# Patient Record
Sex: FEMALE | Race: WHITE | NOT HISPANIC OR LATINO | Employment: UNEMPLOYED | URBAN - METROPOLITAN AREA
[De-identification: names, ages, dates, MRNs, and addresses within clinical notes are randomized per-mention and may not be internally consistent; named-entity substitution may affect disease eponyms.]

---
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
## 2023-02-02 DIAGNOSIS — R10.9 ABDOMINAL CRAMPING: Primary | ICD-10-CM

## 2023-03-10 ENCOUNTER — OFFICE VISIT (OUTPATIENT)
Dept: FAMILY MEDICINE CLINIC | Facility: CLINIC | Age: 18
End: 2023-03-10

## 2023-03-10 VITALS
TEMPERATURE: 97.8 F | SYSTOLIC BLOOD PRESSURE: 120 MMHG | OXYGEN SATURATION: 97 % | BODY MASS INDEX: 27.91 KG/M2 | HEART RATE: 98 BPM | HEIGHT: 63 IN | DIASTOLIC BLOOD PRESSURE: 86 MMHG | WEIGHT: 157.5 LBS

## 2023-03-10 DIAGNOSIS — F41.1 GENERALIZED ANXIETY DISORDER: Primary | ICD-10-CM

## 2023-03-10 DIAGNOSIS — D64.9 ANEMIA, UNSPECIFIED TYPE: ICD-10-CM

## 2023-03-10 DIAGNOSIS — F41.0 PANIC ATTACKS: ICD-10-CM

## 2023-03-10 DIAGNOSIS — G47.9 SLEEP DISTURBANCE: ICD-10-CM

## 2023-03-10 DIAGNOSIS — N92.0 MENORRHAGIA WITH REGULAR CYCLE: ICD-10-CM

## 2023-03-10 RX ORDER — ALPRAZOLAM 0.25 MG/1
0.25 TABLET ORAL
Qty: 5 TABLET | Refills: 0 | Status: SHIPPED | OUTPATIENT
Start: 2023-03-10

## 2023-03-10 RX ORDER — ESCITALOPRAM OXALATE 10 MG/1
10 TABLET ORAL DAILY
Qty: 30 TABLET | Refills: 0 | Status: SHIPPED | OUTPATIENT
Start: 2023-03-10

## 2023-03-10 NOTE — PROGRESS NOTES
Name: Elijah Campos      : 2005      MRN: [id number]  Encounter Provider: Tres Mock MD  Encounter Date: 3/10/2023   Encounter department: [facility] [facility]     1  Generalized anxiety disorder  -     escitalopram (Lexapro) 10 mg tablet; Take 1 tablet (10 mg total) by mouth daily  -     ALPRAZolam (XANAX) 0 25 mg tablet; Take 1 tablet (0 25 mg total) by mouth daily at bedtime as needed for anxiety  -     TSH, 3rd generation with Free T4 reflex; Future  -     Basic metabolic panel; Future    2  Panic attacks  -     TSH, 3rd generation with Free T4 reflex; Future  -     Basic metabolic panel; Future    3  Sleep disturbance  -     TSH, 3rd generation with Free T4 reflex; Future  -     Basic metabolic panel; Future    4  Menorrhagia with regular cycle    5  Anemia, unspecified type  -     TSH, 3rd generation with Free T4 reflex; Future  -     Basic metabolic panel; Future  -     CBC and Platelet; Future  -     Iron Panel (Includes Ferritin, Iron Sat%, Iron, and TIBC); Future  Stop Prozac  Start Lexapro  Patient has panic attacks when going to the dentist   We will provide her with 0 25 mg of Xanax for dental visits  Obtain lab work and follow-up in 6 weeks for recheck         Subjective      Patient presents with: Insomnia: Patient not sleeping     She has tried melatonin  Has trouble falling asleep and staying asleep  Currently taking Prozac for anxiety and depression  Takes this at 7:00 in the morning  Patient also endorses heavy periods and restless legs at bedtime  She has had a low MCV on previous lab work  Review of Systems   Constitutional: Negative for fatigue and fever  Restless legs    HENT: Negative for sore throat  Eyes: Negative for visual disturbance  Respiratory: Negative for cough, chest tightness and shortness of breath  Cardiovascular: Negative for chest pain, palpitations and leg swelling     Gastrointestinal: Negative for abdominal pain, constipation, diarrhea and nausea  Endocrine: Negative for cold intolerance and heat intolerance  Genitourinary: Positive for menstrual problem  Negative for flank pain  Musculoskeletal: Negative for back pain and neck pain  Skin: Negative for rash  Neurological: Negative for headaches  Psychiatric/Behavioral: Positive for sleep disturbance  Negative for behavioral problems and confusion  The patient is nervous/anxious  Current Outpatient Medications on File Prior to Visit   Medication Sig   • albuterol (PROVENTIL HFA,VENTOLIN HFA) 90 mcg/act inhaler Inhale 2 puffs every 6 (six) hours as needed for wheezing   • Xulane 150-35 MCG/24HR APPLY 1 PATCH ONE TIME PER WEEK   • [DISCONTINUED] FLUoxetine (PROzac) 20 mg capsule Take 1 capsule (20 mg total) by mouth daily       Objective     /86 (BP Location: Left arm, Patient Position: Sitting, Cuff Size: Large)   Pulse 98   Temp 97 8 °F (36 6 °C)   Ht 5' 2 5" (1 588 m)   Wt 71 4 kg (157 lb 8 oz)   SpO2 97%   BMI 28 35 kg/m²     Physical Exam  Vitals and nursing note reviewed  Constitutional:       Appearance: She is well-developed  HENT:      Head: Normocephalic and atraumatic  Cardiovascular:      Rate and Rhythm: Normal rate and regular rhythm  Pulmonary:      Effort: Pulmonary effort is normal       Breath sounds: Normal breath sounds  Neurological:      General: No focal deficit present  Mental Status: She is alert     Psychiatric:         Mood and Affect: Mood normal          Behavior: Behavior normal        Halle Wang MD

## 2023-03-12 ENCOUNTER — APPOINTMENT (OUTPATIENT)
Dept: LAB | Facility: CLINIC | Age: 18
End: 2023-03-12

## 2023-03-12 DIAGNOSIS — G47.9 SLEEP DISTURBANCE: ICD-10-CM

## 2023-03-12 DIAGNOSIS — Z11.59 NEED FOR HEPATITIS C SCREENING TEST: ICD-10-CM

## 2023-03-12 DIAGNOSIS — D64.9 ANEMIA, UNSPECIFIED TYPE: ICD-10-CM

## 2023-03-12 DIAGNOSIS — F41.1 GENERALIZED ANXIETY DISORDER: ICD-10-CM

## 2023-03-12 DIAGNOSIS — F41.0 PANIC ATTACKS: ICD-10-CM

## 2023-03-12 DIAGNOSIS — Z11.4 SCREENING FOR HIV (HUMAN IMMUNODEFICIENCY VIRUS): ICD-10-CM

## 2023-03-12 LAB
ANION GAP SERPL CALCULATED.3IONS-SCNC: 6 MMOL/L (ref 4–13)
BUN SERPL-MCNC: 9 MG/DL (ref 5–25)
CALCIUM SERPL-MCNC: 9 MG/DL (ref 8.4–10.2)
CHLORIDE SERPL-SCNC: 106 MMOL/L (ref 96–108)
CO2 SERPL-SCNC: 25 MMOL/L (ref 21–32)
CREAT SERPL-MCNC: 0.68 MG/DL (ref 0.6–1.3)
ERYTHROCYTE [DISTWIDTH] IN BLOOD BY AUTOMATED COUNT: 12.3 % (ref 11.6–15.1)
FERRITIN SERPL-MCNC: 6 NG/ML (ref 8–388)
GFR SERPL CREATININE-BSD FRML MDRD: 126 ML/MIN/1.73SQ M
GLUCOSE P FAST SERPL-MCNC: 76 MG/DL (ref 65–99)
HCT VFR BLD AUTO: 39.8 % (ref 34.8–46.1)
HCV AB SER QL: NORMAL
HGB BLD-MCNC: 12.6 G/DL (ref 11.5–15.4)
IRON SATN MFR SERPL: 22 % (ref 15–50)
IRON SERPL-MCNC: 105 UG/DL (ref 50–170)
MCH RBC QN AUTO: 27.3 PG (ref 26.8–34.3)
MCHC RBC AUTO-ENTMCNC: 31.7 G/DL (ref 31.4–37.4)
MCV RBC AUTO: 86 FL (ref 82–98)
PLATELET # BLD AUTO: 381 THOUSANDS/UL (ref 149–390)
PMV BLD AUTO: 10.4 FL (ref 8.9–12.7)
POTASSIUM SERPL-SCNC: 3.7 MMOL/L (ref 3.5–5.3)
RBC # BLD AUTO: 4.61 MILLION/UL (ref 3.81–5.12)
SODIUM SERPL-SCNC: 137 MMOL/L (ref 135–147)
TIBC SERPL-MCNC: 476 UG/DL (ref 250–450)
TSH SERPL DL<=0.05 MIU/L-ACNC: 1.76 UIU/ML (ref 0.45–4.5)
WBC # BLD AUTO: 9.05 THOUSAND/UL (ref 4.31–10.16)

## 2023-03-13 ENCOUNTER — TELEPHONE (OUTPATIENT)
Dept: FAMILY MEDICINE CLINIC | Facility: CLINIC | Age: 18
End: 2023-03-13

## 2023-03-13 LAB
HIV 1+2 AB+HIV1 P24 AG SERPL QL IA: NORMAL
HIV 2 AB SERPL QL IA: NORMAL
HIV1 AB SERPL QL IA: NORMAL
HIV1 P24 AG SERPL QL IA: NORMAL

## 2023-03-14 NOTE — TELEPHONE ENCOUNTER
Authorization approved, [facility] notified and patient notified through [address]

## 2023-03-29 ENCOUNTER — OFFICE VISIT (OUTPATIENT)
Dept: URGENT CARE | Facility: MEDICAL CENTER | Age: 18
End: 2023-03-29

## 2023-03-29 ENCOUNTER — APPOINTMENT (OUTPATIENT)
Dept: RADIOLOGY | Facility: MEDICAL CENTER | Age: 18
End: 2023-03-29

## 2023-03-29 VITALS
WEIGHT: 157 LBS | DIASTOLIC BLOOD PRESSURE: 70 MMHG | RESPIRATION RATE: 18 BRPM | HEART RATE: 93 BPM | HEIGHT: 63 IN | SYSTOLIC BLOOD PRESSURE: 135 MMHG | BODY MASS INDEX: 27.82 KG/M2 | OXYGEN SATURATION: 99 % | TEMPERATURE: 97.8 F

## 2023-03-29 DIAGNOSIS — S83.92XA SPRAIN OF LEFT KNEE, UNSPECIFIED LIGAMENT, INITIAL ENCOUNTER: ICD-10-CM

## 2023-03-29 DIAGNOSIS — S83.92XA SPRAIN OF LEFT KNEE, UNSPECIFIED LIGAMENT, INITIAL ENCOUNTER: Primary | ICD-10-CM

## 2023-03-29 NOTE — PATIENT INSTRUCTIONS
Left knee sprain  Rest, ice, elevate, over-the-counter pain medication  Referred to orthopedics  Follow up with PCP in 3-5 days  Proceed to  ER if symptoms worsen

## 2023-03-29 NOTE — PROGRESS NOTES
"  [address] Now        NAME: Ran Sullivan is a 19 y o  female  : 2005    MRN: [id number]  DATE: 2023  TIME: 7:10 PM    Assessment and Plan   Sprain of left knee, unspecified ligament, initial encounter [S83  92XA]  1  Sprain of left knee, unspecified ligament, initial encounter  XR knee 4+ vw left injury            Patient Instructions     Left knee sprain  Rest, ice, elevate, over-the-counter pain medication  Referred to orthopedics  Follow up with PCP in 3-5 days  Proceed to  ER if symptoms worsen  Chief Complaint     Chief Complaint   Patient presents with   • Knee Pain     Was at the grocery store and \"did something to her knee\" picking up a case of water  Reports pain in the left  Denies previous injury or sx to that knee  History of Present Illness       17-year-old female who presents complaining of left knee pain  Patient states that she was leaning forward trying to  a case in the grocery store when she felt her right knee buckled backwards  Patient complains of pain on the front side of the knee  Denies fall, head trauma      Review of Systems   Review of Systems   Constitutional: Negative  HENT: Negative  Eyes: Negative  Respiratory: Negative  Negative for cough, chest tightness, shortness of breath, wheezing and stridor  Cardiovascular: Negative  Negative for chest pain, palpitations and leg swelling  Musculoskeletal: Positive for arthralgias           Current Medications       Current Outpatient Medications:   •  albuterol (PROVENTIL HFA,VENTOLIN HFA) 90 mcg/act inhaler, Inhale 2 puffs every 6 (six) hours as needed for wheezing, Disp: 8 g, Rfl: 0  •  ALPRAZolam (XANAX) 0 25 mg tablet, Take 1 tablet (0 25 mg total) by mouth daily at bedtime as needed for anxiety, Disp: 5 tablet, Rfl: 0  •  escitalopram (Lexapro) 10 mg tablet, Take 1 tablet (10 mg total) by mouth daily, Disp: 30 tablet, Rfl: 0  •  Xulane 150-35 MCG/24HR, APPLY 1 PATCH ONE TIME " "PER WEEK, Disp: 9 patch, Rfl: 1    Current Allergies     Allergies as of 03/29/2023 - Reviewed 03/29/2023   Allergen Reaction Noted   • Penicillins Rash 03/06/2016            The following portions of the patient's history were reviewed and updated as appropriate: allergies, current medications, past family history, past medical history, past social history, past surgical history and problem list      Past Medical History:   Diagnosis Date   • Asthma    • Urinary tract infection        Past Surgical History:   Procedure Laterality Date   • DENTAL SURGERY     • NO PAST SURGERIES         Family History   Problem Relation Age of Onset   • Heart disease Father    • Hypertension Father    • Diabetes Father    • Coronary artery disease Father    • Hyperlipidemia Father    • Ovarian cancer Maternal Grandmother    • Diabetes Maternal Grandmother    • Arthritis Mother    • No Known Problems Sister          Medications have been verified  Objective   /70   Pulse 93   Temp 97 8 °F (36 6 °C)   Resp 18   Ht 5' 2 5\" (1 588 m)   Wt 71 2 kg (157 lb)   LMP 03/22/2023 (Approximate)   SpO2 99%   BMI 28 26 kg/m²        Physical Exam     Physical Exam  Constitutional:       General: She is not in acute distress  Appearance: Normal appearance  She is well-developed  She is not diaphoretic  HENT:      Head: Normocephalic and atraumatic  Right Ear: Hearing and external ear normal       Left Ear: Hearing and external ear normal       Mouth/Throat:      Pharynx: Uvula midline  Cardiovascular:      Rate and Rhythm: Normal rate and regular rhythm  Heart sounds: Normal heart sounds  Pulmonary:      Effort: Pulmonary effort is normal       Breath sounds: Normal breath sounds  Musculoskeletal:      Cervical back: Normal range of motion and neck supple  Right knee: Normal       Left knee: Swelling present  No deformity, effusion, erythema, ecchymosis or lacerations  Decreased range of motion   " Tenderness present over the lateral joint line and patellar tendon  No medial joint line, MCL, LCL, ACL or PCL tenderness  No LCL laxity, MCL laxity, ACL laxity or PCL laxity  Lymphadenopathy:      Cervical: No cervical adenopathy  Neurological:      Mental Status: She is alert

## 2023-04-07 ENCOUNTER — OFFICE VISIT (OUTPATIENT)
Dept: FAMILY MEDICINE CLINIC | Facility: CLINIC | Age: 18
End: 2023-04-07

## 2023-04-07 VITALS
DIASTOLIC BLOOD PRESSURE: 74 MMHG | OXYGEN SATURATION: 99 % | RESPIRATION RATE: 16 BRPM | WEIGHT: 158.5 LBS | TEMPERATURE: 97.9 F | BODY MASS INDEX: 28.08 KG/M2 | HEART RATE: 95 BPM | SYSTOLIC BLOOD PRESSURE: 126 MMHG | HEIGHT: 63 IN

## 2023-04-07 DIAGNOSIS — H91.93 HEARING DECREASED, BILATERAL: ICD-10-CM

## 2023-04-07 DIAGNOSIS — G47.9 SLEEP DISTURBANCE: ICD-10-CM

## 2023-04-07 DIAGNOSIS — F41.1 GENERALIZED ANXIETY DISORDER: Primary | ICD-10-CM

## 2023-04-07 DIAGNOSIS — F41.0 PANIC ATTACKS: ICD-10-CM

## 2023-04-07 DIAGNOSIS — R79.0 LOW FERRITIN LEVEL: ICD-10-CM

## 2023-04-07 RX ORDER — ESCITALOPRAM OXALATE 20 MG/1
20 TABLET ORAL DAILY
Qty: 30 TABLET | Refills: 2 | Status: SHIPPED | OUTPATIENT
Start: 2023-04-07

## 2023-04-07 RX ORDER — TRAZODONE HYDROCHLORIDE 50 MG/1
25 TABLET ORAL
Qty: 15 TABLET | Refills: 0 | Status: SHIPPED | OUTPATIENT
Start: 2023-04-07 | End: 2023-05-07

## 2023-04-07 RX ORDER — BUSPIRONE HYDROCHLORIDE 5 MG/1
5 TABLET ORAL 2 TIMES DAILY
Qty: 60 TABLET | Refills: 0 | Status: SHIPPED | OUTPATIENT
Start: 2023-04-07

## 2023-04-07 NOTE — PROGRESS NOTES
Name: Reta Mcardle      : 2005      MRN: [id number]  Encounter Provider: Nuno Maria MD  Encounter Date: 2023   Encounter department: [address] [place] [address] St     1  Generalized anxiety disorder  -     escitalopram (Lexapro) 20 mg tablet; Take 1 tablet (20 mg total) by mouth daily  -     busPIRone (BUSPAR) 5 mg tablet; Take 1 tablet (5 mg total) by mouth 2 (two) times a day    2  Panic attacks    3  Sleep disturbance  -     traZODone (DESYREL) 50 mg tablet; Take 0 5 tablets (25 mg total) by mouth daily at bedtime as needed for sleep    4  Hearing decreased, bilateral  -     Ambulatory Referral to Audiology; Future    5  Low ferritin level    Previously on Prozac  Discontinue Prozac and started on Lexapro  No response to 10 mg Lexapro  We will increase Lexapro to 20 mg daily  We will trial this for 2 weeks  If no improvement can start BuSpar 5 mg twice daily  She is working on establishing with a therapist   Denies any recent panic attacks  We will send to audiology for comprehensive hearing screen  Hearing loss more noticeable with background noise  Low ferritin  Start daily iron  She continues to have difficulty falling asleep and staying asleep  She has failed melatonin, Benadryl and Unisom  None of these have helped her  We will start her on a small dose of trazodone  Did  patient on the increased risk of serotonin syndrome with Lexapro and trazodone  She will take 25 mg of trazodone as needed for sleep  If no response to trazodone will refer over to sleep medicine  Subjective      Patient presents with: Follow-up: 4 week Follow up on Medication Lexapro    Patient reports no improvement on anxiety  Continues to have sleep disturbances  Review of Systems   Constitutional: Positive for fatigue  Negative for fever  HENT: Positive for hearing loss  Negative for sore throat  Eyes: Negative for visual disturbance     Respiratory: "Negative for cough, chest tightness and shortness of breath  Cardiovascular: Negative for chest pain, palpitations and leg swelling  Gastrointestinal: Negative for abdominal pain, constipation, diarrhea and nausea  Endocrine: Negative for cold intolerance and heat intolerance  Genitourinary: Negative for flank pain  Musculoskeletal: Negative for back pain and neck pain  Skin: Negative for rash  Neurological: Negative for headaches  Psychiatric/Behavioral: Positive for sleep disturbance  Negative for behavioral problems, confusion, self-injury and suicidal ideas  The patient is nervous/anxious  Current Outpatient Medications on File Prior to Visit   Medication Sig   • albuterol (PROVENTIL HFA,VENTOLIN HFA) 90 mcg/act inhaler Inhale 2 puffs every 6 (six) hours as needed for wheezing   • Xulane 150-35 MCG/24HR APPLY 1 PATCH ONE TIME PER WEEK   • [DISCONTINUED] escitalopram (Lexapro) 10 mg tablet Take 1 tablet (10 mg total) by mouth daily   • ALPRAZolam (XANAX) 0 25 mg tablet Take 1 tablet (0 25 mg total) by mouth daily at bedtime as needed for anxiety (Patient not taking: Reported on 4/7/2023)       Objective     /74 (BP Location: Left arm, Patient Position: Sitting, Cuff Size: Large)   Pulse 95   Temp 97 9 °F (36 6 °C)   Resp 16   Ht 5' 2 5\" (1 588 m)   Wt 71 9 kg (158 lb 8 oz)   LMP 03/22/2023 (Approximate)   SpO2 99%   BMI 28 53 kg/m²     Physical Exam  Vitals and nursing note reviewed  Constitutional:       General: She is not in acute distress  Appearance: She is well-developed  HENT:      Head: Normocephalic and atraumatic  Right Ear: Tympanic membrane normal  There is no impacted cerumen  Left Ear: Tympanic membrane normal  There is no impacted cerumen  Cardiovascular:      Rate and Rhythm: Normal rate and regular rhythm  Pulmonary:      Effort: Pulmonary effort is normal       Breath sounds: Normal breath sounds     Skin:     General: Skin is warm and " dry    Neurological:      General: No focal deficit present  Mental Status: She is alert  Psychiatric:         Mood and Affect: Mood is anxious  Mood is not depressed         Stephanie Diamond MD

## 2023-05-02 ENCOUNTER — OFFICE VISIT (OUTPATIENT)
Dept: FAMILY MEDICINE CLINIC | Facility: CLINIC | Age: 18
End: 2023-05-02

## 2023-05-02 VITALS
HEIGHT: 63 IN | WEIGHT: 161.5 LBS | OXYGEN SATURATION: 99 % | DIASTOLIC BLOOD PRESSURE: 86 MMHG | BODY MASS INDEX: 28.62 KG/M2 | TEMPERATURE: 97 F | SYSTOLIC BLOOD PRESSURE: 110 MMHG | HEART RATE: 116 BPM

## 2023-05-02 DIAGNOSIS — G47.9 SLEEP DISTURBANCE: ICD-10-CM

## 2023-05-02 DIAGNOSIS — R79.0 LOW FERRITIN LEVEL: Primary | ICD-10-CM

## 2023-05-02 DIAGNOSIS — F41.1 GENERALIZED ANXIETY DISORDER: ICD-10-CM

## 2023-05-02 DIAGNOSIS — F41.0 PANIC ATTACKS: ICD-10-CM

## 2023-05-02 DIAGNOSIS — R00.2 PALPITATIONS: ICD-10-CM

## 2023-05-02 RX ORDER — ESCITALOPRAM OXALATE 20 MG/1
20 TABLET ORAL DAILY
Qty: 30 TABLET | Refills: 5 | Status: SHIPPED | OUTPATIENT
Start: 2023-05-02

## 2023-05-02 RX ORDER — TRAZODONE HYDROCHLORIDE 50 MG/1
50 TABLET ORAL
Qty: 30 TABLET | Refills: 5 | Status: SHIPPED | OUTPATIENT
Start: 2023-05-02 | End: 2023-06-01

## 2023-05-02 NOTE — PROGRESS NOTES
Name: Jonathon Ogden      : 2005      MRN: [id number]  Encounter Provider: Leanne Arthur MD  Encounter Date: 2023   Encounter department: [facility] [facility] [address]     1  Low ferritin level    2  Sleep disturbance  -     traZODone (DESYREL) 50 mg tablet; Take 1 tablet (50 mg total) by mouth daily at bedtime as needed for sleep    3  Generalized anxiety disorder  -     escitalopram (Lexapro) 20 mg tablet; Take 1 tablet (20 mg total) by mouth daily    4  Panic attacks    5  Palpitations  -     Holter monitor; Future; Expected date: 2023       Working on establishing with therapist   Prefers a virtual therapist   We will reach out to insurance company regarding coverage  Provided patient with information on psychology today website  Can try liquid iron to avoid stomachaches for low ferritin  Continue medications above  Patient has Xanax for panic attacks if needed    We will start with Holter monitor for ongoing palpitations  This is been ongoing issue for patient  She does have underlying past medical history for asthma  This would be a direct contraindication to propranolol  Could consider atenolol for the palpitations will await results of Holter monitor first  Signifigant family history CAD  Father 9 heart attacks  Subjective      Patient presents with: Follow-up: 1 month    Patient doing well with Lexapro 20 mg  She increased trazodone to 50 mg and this is helping significantly with sleep  Continues to have palpitations  This has been an ongoing issue for her  Anxiety has greatly improved but continues to have the palpitations  Significant family history of cardiac disease  Review of Systems   Constitutional: Negative for fatigue and fever  HENT: Negative for sore throat  Eyes: Negative for visual disturbance  Respiratory: Negative for cough, chest tightness and shortness of breath  Cardiovascular: Positive for palpitations   Negative for "chest pain and leg swelling  Gastrointestinal: Negative for abdominal pain, constipation, diarrhea and nausea  Endocrine: Negative for cold intolerance and heat intolerance  Genitourinary: Negative for flank pain  Musculoskeletal: Negative for back pain and neck pain  Skin: Negative for rash  Neurological: Negative for headaches  Psychiatric/Behavioral: Negative for behavioral problems and confusion  Current Outpatient Medications on File Prior to Visit   Medication Sig    albuterol (PROVENTIL HFA,VENTOLIN HFA) 90 mcg/act inhaler Inhale 2 puffs every 6 (six) hours as needed for wheezing    ALPRAZolam (XANAX) 0 25 mg tablet Take 1 tablet (0 25 mg total) by mouth daily at bedtime as needed for anxiety    Xulane 150-35 MCG/24HR APPLY 1 PATCH ONE TIME PER WEEK    [DISCONTINUED] escitalopram (Lexapro) 20 mg tablet Take 1 tablet (20 mg total) by mouth daily    [DISCONTINUED] traZODone (DESYREL) 50 mg tablet Take 0 5 tablets (25 mg total) by mouth daily at bedtime as needed for sleep    [DISCONTINUED] busPIRone (BUSPAR) 5 mg tablet Take 1 tablet (5 mg total) by mouth 2 (two) times a day (Patient not taking: Reported on 5/2/2023)       Objective     /86 (BP Location: Left arm, Patient Position: Sitting, Cuff Size: Standard)   Pulse (!) 116   Temp (!) 97 °F (36 1 °C)   Ht 5' 2 5\" (1 588 m)   Wt 73 3 kg (161 lb 8 oz)   SpO2 99%   BMI 29 07 kg/m²     Physical Exam  Vitals and nursing note reviewed  Constitutional:       Appearance: She is well-developed  HENT:      Head: Normocephalic and atraumatic  Cardiovascular:      Rate and Rhythm: Regular rhythm  Tachycardia present  Pulmonary:      Effort: Pulmonary effort is normal       Breath sounds: Normal breath sounds  Neurological:      General: No focal deficit present  Mental Status: She is alert     Psychiatric:         Mood and Affect: Mood normal          Behavior: Behavior normal        Cory Su MD  "

## 2023-05-03 DIAGNOSIS — J45.21 MILD INTERMITTENT REACTIVE AIRWAY DISEASE WITH ACUTE EXACERBATION: ICD-10-CM

## 2023-05-04 RX ORDER — ALBUTEROL SULFATE 90 UG/1
2 AEROSOL, METERED RESPIRATORY (INHALATION) EVERY 6 HOURS PRN
Qty: 8 G | Refills: 0 | Status: SHIPPED | OUTPATIENT
Start: 2023-05-04

## 2023-06-15 ENCOUNTER — OFFICE VISIT (OUTPATIENT)
Dept: CARDIOLOGY CLINIC | Facility: CLINIC | Age: 18
End: 2023-06-15
Payer: COMMERCIAL

## 2023-06-15 VITALS
DIASTOLIC BLOOD PRESSURE: 68 MMHG | BODY MASS INDEX: 28.17 KG/M2 | HEART RATE: 80 BPM | HEIGHT: 63 IN | WEIGHT: 159 LBS | SYSTOLIC BLOOD PRESSURE: 108 MMHG

## 2023-06-15 DIAGNOSIS — R00.2 PALPITATION: Primary | ICD-10-CM

## 2023-06-15 DIAGNOSIS — Z82.49 FAMILY HISTORY OF PREMATURE CAD: ICD-10-CM

## 2023-06-15 PROCEDURE — 99204 OFFICE O/P NEW MOD 45 MIN: CPT | Performed by: INTERNAL MEDICINE

## 2023-06-15 PROCEDURE — 93000 ELECTROCARDIOGRAM COMPLETE: CPT | Performed by: INTERNAL MEDICINE

## 2023-06-15 NOTE — PROGRESS NOTES
"[address] Cardiology  Office Consultation  Kia Carlton 19 y o  female MRN: [id number]        Chief Complaint    Chief Complaint   Patient presents with   • New Patient Visit   • Palpitations     Mostly caused by anxiety per pt  Gets dizzy and lightheaded  • Shortness of Breath     With exertion       Referring Provider: No ref  provider found    Impression & Plan:    1  Palpitation  Her symptoms are not entirely explainable by anxiety induced palpitations  She does have a sustained sense of increase in heart rate  We must evaluate for paroxysmal tachyarrhythmias, such as SVT  Check Holter monitor   - POCT ECG    2  Family history of premature CAD  Her father had CABG in his 45s  This does raise the concern for disorder such as familial hypercholesterolemia  We will check a lipid panel   - Lipid Panel with Direct LDL reflex; Future        We will see Kia Carlton back TBD    HPI: Kia Carlton is a 19y o  year old female who presents with palpitations  She has had palpitations since in high school  She experiences heart racing  It happens often enough to annoy her -- at least 3 - 4 times per day  It occurs at least once per day  The symptoms last about 10 minutes, other times up to 1 hour  It feels like her heart is racing continuously  Sometimes she gets lightheaded with the symptoms but has not had syncope  She has had to sit down due to the feeling of presyncope  She has anxiety, \"I'm always anxious\", she does get more palpitations when she is otherwise more nervous  Cardiac testing:         EKG reviewed personally:   EKG in the office 6/15/2023 shows normal sinus rhythm, 80 bpm, normal axis, normal intervals  Normal study  Relevant Laboratory Studies:  (Laboratory studies personally reviewed)  TSH 3/12/23 -- WNL  BMP 3/12/23 reviewed -- WNL  CBC 3/12/23 reviewed -- WNL      Review of Systems   Constitutional: Negative for activity change and fatigue     HENT: Negative for facial " swelling  Eyes: Negative for visual disturbance  Respiratory: Negative for chest tightness and shortness of breath  Cardiovascular: Negative for chest pain, palpitations and leg swelling  Gastrointestinal: Negative for nausea and vomiting  Musculoskeletal: Negative for myalgias  Skin: Negative for pallor  Allergic/Immunologic: Negative for immunocompromised state  Neurological: Negative for dizziness, syncope and light-headedness  Psychiatric/Behavioral: Negative for agitation and confusion  The patient is not nervous/anxious  Past Medical History:   Diagnosis Date   • Asthma    • Urinary tract infection      Past Surgical History:   Procedure Laterality Date   • DENTAL SURGERY     • NO PAST SURGERIES       Social History     Substance and Sexual Activity   Alcohol Use No     Social History     Substance and Sexual Activity   Drug Use No     Social History     Tobacco Use   Smoking Status Never   • Passive exposure: Never   Smokeless Tobacco Never     Family History   Problem Relation Age of Onset   • Heart disease Father    • Hypertension Father    • Diabetes Father    • Coronary artery disease Father    • Hyperlipidemia Father    • Ovarian cancer Maternal Grandmother    • Diabetes Maternal Grandmother    • Arthritis Mother    • No Known Problems Sister        Allergies: Allergies   Allergen Reactions   • Penicillins Rash       Medications (as of START of this encounter):    Outpatient Medications Prior to Visit   Medication Sig Dispense Refill   • albuterol (PROVENTIL HFA,VENTOLIN HFA) 90 mcg/act inhaler Inhale 2 puffs every 6 (six) hours as needed for wheezing 8 g 0   • ALPRAZolam (XANAX) 0 25 mg tablet Take 1 tablet (0 25 mg total) by mouth daily at bedtime as needed for anxiety 5 tablet 0   • escitalopram (Lexapro) 20 mg tablet Take 1 tablet (20 mg total) by mouth daily 30 tablet 5   • traZODone (DESYREL) 50 mg tablet Take 1 tablet (50 mg total) by mouth daily at bedtime as needed "for sleep 30 tablet 5   • Xulane 150-35 MCG/24HR APPLY 1 PATCH ONE TIME PER WEEK 9 patch 1     No facility-administered medications prior to visit  Vitals:    06/15/23 0904   BP: 108/68   Pulse: 80     Weight (last 2 days)     Date/Time Weight    06/15/23 0904 72 1 (159)          General: Tanya Gordon is a well appearing female, in no acute distress, sitting comfortably  HEENT: moist mucous membranes, EOMI  Neck:  No JVD, supple, trachea midline   Cardiovascular: unremarkable S1/S2, regular rate and rhythm, no murmurs, rubs or gallops   Pulmonary: normal respiratory effort, CTAB   Abdomen: soft and nondistended  Extremities: No lower extremity edema  Warm and well perfused extremities  Neuro: no focal motor deficits, AAOx3 (person, place, time)  Psych: Normal mood and affect, cooperative        Time Spent:  Total time (face-to-face and non-face-to-face) spent on today's visit was 40 minutes  This includes preparation for the visits (i e  reviewing test results), performance of a medically appropriate history and examination, and orders for medications, tests or other procedures  This time is exclusive of procedures performed and time spent teaching  Michelle Martin MD    This note was completed in part utilizing [facility] [employer] recognition software  Grammatical errors, random word insertion, spelling mistakes, occasional wrong word or \"sound-alike\" substitutions and incomplete sentences may be an occasional consequence of the system secondary to software limitations, ambient noise and hardware issues  At the time of dictation, efforts were made to edit, clarify and /or correct errors  Please read the chart carefully and recognize, using context, where substitutions have occurred  If you have any questions or concerns about the context, text or information contained within the body of this dictation, please contact myself, the provider, for further clarification      "

## 2023-06-16 ENCOUNTER — HOSPITAL ENCOUNTER (OUTPATIENT)
Dept: NON INVASIVE DIAGNOSTICS | Facility: HOSPITAL | Age: 18
Discharge: HOME | End: 2023-06-16
Attending: FAMILY MEDICINE
Payer: COMMERCIAL

## 2023-06-16 ENCOUNTER — APPOINTMENT (OUTPATIENT)
Dept: LAB | Facility: MEDICAL CENTER | Age: 18
End: 2023-06-16
Payer: COMMERCIAL

## 2023-06-16 DIAGNOSIS — Z82.49 FAMILY HISTORY OF PREMATURE CAD: ICD-10-CM

## 2023-06-16 DIAGNOSIS — R00.2 PALPITATIONS: ICD-10-CM

## 2023-06-16 LAB
CHOLEST SERPL-MCNC: 173 MG/DL
HDLC SERPL-MCNC: 62 MG/DL
LDLC SERPL CALC-MCNC: 90 MG/DL (ref 0–100)
TRIGL SERPL-MCNC: 105 MG/DL

## 2023-06-16 PROCEDURE — 80061 LIPID PANEL: CPT

## 2023-06-16 PROCEDURE — 36415 COLL VENOUS BLD VENIPUNCTURE: CPT

## 2023-06-16 PROCEDURE — 93226 XTRNL ECG REC<48 HR SCAN A/R: CPT

## 2023-06-16 PROCEDURE — 93225 XTRNL ECG REC<48 HRS REC: CPT

## 2023-06-20 ENCOUNTER — APPOINTMENT (EMERGENCY)
Dept: CT IMAGING | Facility: HOSPITAL | Age: 18
End: 2023-06-20
Payer: COMMERCIAL

## 2023-06-20 ENCOUNTER — HOSPITAL ENCOUNTER (OUTPATIENT)
Facility: HOSPITAL | Age: 18
Setting detail: OUTPATIENT SURGERY
Discharge: HOME | End: 2023-06-21
Attending: EMERGENCY MEDICINE | Admitting: SURGERY
Payer: COMMERCIAL

## 2023-06-20 ENCOUNTER — APPOINTMENT (EMERGENCY)
Dept: ULTRASOUND IMAGING | Facility: HOSPITAL | Age: 18
End: 2023-06-20
Payer: COMMERCIAL

## 2023-06-20 DIAGNOSIS — K80.00 ACUTE CHOLECYSTITIS DUE TO BILIARY CALCULUS: ICD-10-CM

## 2023-06-20 DIAGNOSIS — K81.9 CHOLECYSTITIS: Primary | ICD-10-CM

## 2023-06-20 LAB
ALBUMIN SERPL BCP-MCNC: 4 G/DL (ref 3.5–5)
ALP SERPL-CCNC: 75 U/L (ref 34–104)
ALT SERPL W P-5'-P-CCNC: 12 U/L (ref 7–52)
ANION GAP SERPL CALCULATED.3IONS-SCNC: 7 MMOL/L (ref 4–13)
APTT PPP: 32 SECONDS (ref 23–37)
AST SERPL W P-5'-P-CCNC: 14 U/L (ref 13–39)
BASOPHILS # BLD AUTO: 0.03 THOUSANDS/ÂΜL (ref 0–0.1)
BASOPHILS NFR BLD AUTO: 0 % (ref 0–1)
BILIRUB SERPL-MCNC: 0.29 MG/DL (ref 0.2–1)
BILIRUB UR QL STRIP: NEGATIVE
BUN SERPL-MCNC: 7 MG/DL (ref 5–25)
CALCIUM SERPL-MCNC: 9.3 MG/DL (ref 8.4–10.2)
CHLORIDE SERPL-SCNC: 106 MMOL/L (ref 96–108)
CLARITY UR: CLEAR
CO2 SERPL-SCNC: 25 MMOL/L (ref 21–32)
COLOR UR: NORMAL
CREAT SERPL-MCNC: 0.66 MG/DL (ref 0.6–1.3)
EOSINOPHIL # BLD AUTO: 0.2 THOUSAND/ÂΜL (ref 0–0.61)
EOSINOPHIL NFR BLD AUTO: 2 % (ref 0–6)
ERYTHROCYTE [DISTWIDTH] IN BLOOD BY AUTOMATED COUNT: 12.7 % (ref 11.6–15.1)
EXT PREGNANCY TEST URINE: NEGATIVE
EXT. CONTROL: NORMAL
GFR SERPL CREATININE-BSD FRML MDRD: 127 ML/MIN/1.73SQ M
GLUCOSE SERPL-MCNC: 96 MG/DL (ref 65–140)
GLUCOSE UR STRIP-MCNC: NEGATIVE MG/DL
HCT VFR BLD AUTO: 38 % (ref 34.8–46.1)
HGB BLD-MCNC: 12.3 G/DL (ref 11.5–15.4)
HGB UR QL STRIP.AUTO: NEGATIVE
IMM GRANULOCYTES # BLD AUTO: 0.04 THOUSAND/UL (ref 0–0.2)
IMM GRANULOCYTES NFR BLD AUTO: 0 % (ref 0–2)
INR PPP: 1.06 (ref 0.84–1.19)
KETONES UR STRIP-MCNC: NEGATIVE MG/DL
LACTATE SERPL-SCNC: 0.8 MMOL/L (ref 0.5–2)
LEUKOCYTE ESTERASE UR QL STRIP: NEGATIVE
LIPASE SERPL-CCNC: 14 U/L (ref 11–82)
LYMPHOCYTES # BLD AUTO: 3.91 THOUSANDS/ÂΜL (ref 0.6–4.47)
LYMPHOCYTES NFR BLD AUTO: 34 % (ref 14–44)
MCH RBC QN AUTO: 27.8 PG (ref 26.8–34.3)
MCHC RBC AUTO-ENTMCNC: 32.4 G/DL (ref 31.4–37.4)
MCV RBC AUTO: 86 FL (ref 82–98)
MONOCYTES # BLD AUTO: 0.88 THOUSAND/ÂΜL (ref 0.17–1.22)
MONOCYTES NFR BLD AUTO: 8 % (ref 4–12)
NEUTROPHILS # BLD AUTO: 6.3 THOUSANDS/ÂΜL (ref 1.85–7.62)
NEUTS SEG NFR BLD AUTO: 56 % (ref 43–75)
NITRITE UR QL STRIP: NEGATIVE
NRBC BLD AUTO-RTO: 0 /100 WBCS
PH UR STRIP.AUTO: 6.5 [PH]
PLATELET # BLD AUTO: 338 THOUSANDS/UL (ref 149–390)
PMV BLD AUTO: 9.7 FL (ref 8.9–12.7)
POTASSIUM SERPL-SCNC: 3.2 MMOL/L (ref 3.5–5.3)
PROT SERPL-MCNC: 6.8 G/DL (ref 6.4–8.4)
PROT UR STRIP-MCNC: NEGATIVE MG/DL
PROTHROMBIN TIME: 14 SECONDS (ref 11.6–14.5)
RBC # BLD AUTO: 4.43 MILLION/UL (ref 3.81–5.12)
SODIUM SERPL-SCNC: 138 MMOL/L (ref 135–147)
SP GR UR STRIP.AUTO: 1.01 (ref 1–1.03)
UROBILINOGEN UR STRIP-ACNC: <2 MG/DL
WBC # BLD AUTO: 11.36 THOUSAND/UL (ref 4.31–10.16)

## 2023-06-20 PROCEDURE — G1004 CDSM NDSC: HCPCS

## 2023-06-20 PROCEDURE — 96376 TX/PRO/DX INJ SAME DRUG ADON: CPT

## 2023-06-20 PROCEDURE — 36415 COLL VENOUS BLD VENIPUNCTURE: CPT

## 2023-06-20 PROCEDURE — 83605 ASSAY OF LACTIC ACID: CPT | Performed by: EMERGENCY MEDICINE

## 2023-06-20 PROCEDURE — 99284 EMERGENCY DEPT VISIT MOD MDM: CPT

## 2023-06-20 PROCEDURE — 96367 TX/PROPH/DG ADDL SEQ IV INF: CPT

## 2023-06-20 PROCEDURE — 99205 OFFICE O/P NEW HI 60 MIN: CPT | Performed by: SURGERY

## 2023-06-20 PROCEDURE — 96361 HYDRATE IV INFUSION ADD-ON: CPT

## 2023-06-20 PROCEDURE — 80053 COMPREHEN METABOLIC PANEL: CPT | Performed by: EMERGENCY MEDICINE

## 2023-06-20 PROCEDURE — 74177 CT ABD & PELVIS W/CONTRAST: CPT

## 2023-06-20 PROCEDURE — 83690 ASSAY OF LIPASE: CPT | Performed by: EMERGENCY MEDICINE

## 2023-06-20 PROCEDURE — 76705 ECHO EXAM OF ABDOMEN: CPT

## 2023-06-20 PROCEDURE — 96375 TX/PRO/DX INJ NEW DRUG ADDON: CPT

## 2023-06-20 PROCEDURE — 87040 BLOOD CULTURE FOR BACTERIA: CPT | Performed by: EMERGENCY MEDICINE

## 2023-06-20 PROCEDURE — 96365 THER/PROPH/DIAG IV INF INIT: CPT

## 2023-06-20 PROCEDURE — 85730 THROMBOPLASTIN TIME PARTIAL: CPT | Performed by: EMERGENCY MEDICINE

## 2023-06-20 PROCEDURE — 85025 COMPLETE CBC W/AUTO DIFF WBC: CPT | Performed by: EMERGENCY MEDICINE

## 2023-06-20 PROCEDURE — 81003 URINALYSIS AUTO W/O SCOPE: CPT | Performed by: EMERGENCY MEDICINE

## 2023-06-20 PROCEDURE — 81025 URINE PREGNANCY TEST: CPT | Performed by: EMERGENCY MEDICINE

## 2023-06-20 PROCEDURE — 85610 PROTHROMBIN TIME: CPT | Performed by: EMERGENCY MEDICINE

## 2023-06-20 RX ORDER — METRONIDAZOLE 500 MG/100ML
500 INJECTION, SOLUTION INTRAVENOUS EVERY 8 HOURS
Status: DISCONTINUED | OUTPATIENT
Start: 2023-06-20 | End: 2023-06-21

## 2023-06-20 RX ORDER — METHOCARBAMOL 500 MG/1
500 TABLET, FILM COATED ORAL EVERY 6 HOURS SCHEDULED
Status: DISCONTINUED | OUTPATIENT
Start: 2023-06-20 | End: 2023-06-20

## 2023-06-20 RX ORDER — HYDROMORPHONE HCL/PF 1 MG/ML
0.5 SYRINGE (ML) INJECTION ONCE
Status: COMPLETED | OUTPATIENT
Start: 2023-06-20 | End: 2023-06-20

## 2023-06-20 RX ORDER — OXYCODONE HYDROCHLORIDE 5 MG/1
5 TABLET ORAL EVERY 4 HOURS PRN
Status: DISCONTINUED | OUTPATIENT
Start: 2023-06-20 | End: 2023-06-21 | Stop reason: HOSPADM

## 2023-06-20 RX ORDER — HYDROMORPHONE HCL IN WATER/PF 6 MG/30 ML
0.2 PATIENT CONTROLLED ANALGESIA SYRINGE INTRAVENOUS
Status: DISCONTINUED | OUTPATIENT
Start: 2023-06-20 | End: 2023-06-21 | Stop reason: HOSPADM

## 2023-06-20 RX ORDER — POTASSIUM CHLORIDE 20MEQ/15ML
40 LIQUID (ML) ORAL ONCE
Status: COMPLETED | OUTPATIENT
Start: 2023-06-20 | End: 2023-06-20

## 2023-06-20 RX ORDER — DIAZEPAM 5 MG/1
5 TABLET ORAL EVERY 6 HOURS PRN
Status: DISCONTINUED | OUTPATIENT
Start: 2023-06-20 | End: 2023-06-21 | Stop reason: HOSPADM

## 2023-06-20 RX ORDER — ONDANSETRON 2 MG/ML
4 INJECTION INTRAMUSCULAR; INTRAVENOUS ONCE
Status: COMPLETED | OUTPATIENT
Start: 2023-06-20 | End: 2023-06-20

## 2023-06-20 RX ORDER — DIPHENHYDRAMINE HYDROCHLORIDE 50 MG/ML
25 INJECTION INTRAMUSCULAR; INTRAVENOUS EVERY 6 HOURS PRN
Status: DISCONTINUED | OUTPATIENT
Start: 2023-06-20 | End: 2023-06-21 | Stop reason: HOSPADM

## 2023-06-20 RX ORDER — SODIUM CHLORIDE, SODIUM LACTATE, POTASSIUM CHLORIDE, CALCIUM CHLORIDE 600; 310; 30; 20 MG/100ML; MG/100ML; MG/100ML; MG/100ML
125 INJECTION, SOLUTION INTRAVENOUS CONTINUOUS
Status: DISCONTINUED | OUTPATIENT
Start: 2023-06-20 | End: 2023-06-21

## 2023-06-20 RX ORDER — CEFAZOLIN SODIUM 1 G/50ML
1000 SOLUTION INTRAVENOUS EVERY 8 HOURS
Status: DISCONTINUED | OUTPATIENT
Start: 2023-06-20 | End: 2023-06-21

## 2023-06-20 RX ORDER — HEPARIN SODIUM 5000 [USP'U]/ML
5000 INJECTION, SOLUTION INTRAVENOUS; SUBCUTANEOUS EVERY 8 HOURS SCHEDULED
Status: DISCONTINUED | OUTPATIENT
Start: 2023-06-20 | End: 2023-06-21

## 2023-06-20 RX ORDER — LEVOFLOXACIN 5 MG/ML
750 INJECTION, SOLUTION INTRAVENOUS ONCE
Status: COMPLETED | OUTPATIENT
Start: 2023-06-20 | End: 2023-06-20

## 2023-06-20 RX ORDER — POTASSIUM CHLORIDE 20 MEQ/1
40 TABLET, EXTENDED RELEASE ORAL ONCE
Status: DISCONTINUED | OUTPATIENT
Start: 2023-06-20 | End: 2023-06-20

## 2023-06-20 RX ORDER — METRONIDAZOLE 500 MG/100ML
500 INJECTION, SOLUTION INTRAVENOUS ONCE
Status: COMPLETED | OUTPATIENT
Start: 2023-06-20 | End: 2023-06-20

## 2023-06-20 RX ORDER — ACETAMINOPHEN 325 MG/1
650 TABLET ORAL EVERY 6 HOURS
Status: DISCONTINUED | OUTPATIENT
Start: 2023-06-20 | End: 2023-06-21 | Stop reason: HOSPADM

## 2023-06-20 RX ADMIN — CEFAZOLIN SODIUM 1000 MG: 1 SOLUTION INTRAVENOUS at 19:19

## 2023-06-20 RX ADMIN — ONDANSETRON 4 MG: 2 INJECTION INTRAMUSCULAR; INTRAVENOUS at 01:44

## 2023-06-20 RX ADMIN — OXYCODONE HYDROCHLORIDE 5 MG: 5 TABLET ORAL at 11:24

## 2023-06-20 RX ADMIN — HEPARIN SODIUM 5000 UNITS: 5000 INJECTION INTRAVENOUS; SUBCUTANEOUS at 21:28

## 2023-06-20 RX ADMIN — ACETAMINOPHEN 650 MG: 325 TABLET, FILM COATED ORAL at 11:24

## 2023-06-20 RX ADMIN — METRONIDAZOLE 500 MG: 500 INJECTION, SOLUTION INTRAVENOUS at 08:48

## 2023-06-20 RX ADMIN — HYDROMORPHONE HYDROCHLORIDE 0.5 MG: 1 INJECTION, SOLUTION INTRAMUSCULAR; INTRAVENOUS; SUBCUTANEOUS at 05:50

## 2023-06-20 RX ADMIN — SODIUM CHLORIDE 1000 ML: 0.9 INJECTION, SOLUTION INTRAVENOUS at 01:48

## 2023-06-20 RX ADMIN — METRONIDAZOLE 500 MG: 500 INJECTION, SOLUTION INTRAVENOUS at 17:29

## 2023-06-20 RX ADMIN — IOHEXOL 80 ML: 350 INJECTION, SOLUTION INTRAVENOUS at 03:33

## 2023-06-20 RX ADMIN — HYDROMORPHONE HYDROCHLORIDE 0.5 MG: 1 INJECTION, SOLUTION INTRAMUSCULAR; INTRAVENOUS; SUBCUTANEOUS at 02:10

## 2023-06-20 RX ADMIN — CEFAZOLIN SODIUM 1000 MG: 1 SOLUTION INTRAVENOUS at 11:26

## 2023-06-20 RX ADMIN — LEVOFLOXACIN 750 MG: 750 INJECTION, SOLUTION INTRAVENOUS at 09:48

## 2023-06-20 RX ADMIN — SODIUM CHLORIDE, SODIUM LACTATE, POTASSIUM CHLORIDE, AND CALCIUM CHLORIDE 125 ML/HR: .6; .31; .03; .02 INJECTION, SOLUTION INTRAVENOUS at 17:57

## 2023-06-20 RX ADMIN — DIAZEPAM 5 MG: 5 TABLET ORAL at 17:57

## 2023-06-20 RX ADMIN — POTASSIUM CHLORIDE 40 MEQ: 1.5 SOLUTION ORAL at 04:25

## 2023-06-20 NOTE — H&P
H&P Exam - General Surgery   Stormy Stockton 19 y o  female MRN: [id number]  Unit/Bed#: ED-07 Encounter: [id number]    Assessment/Plan     Assessment:  49-year-old female with signs and symptoms of acute cholecystitis  Positive Tripathi sign and imaging evidence of stone in gallbladder neck  Discussed recommendations for cholecystectomy with the patient and her father and Philly Rodriguez wishes to proceed with surgery at this time  Plan:  -OR for laparoscopic cholecystectomy  -Ancef/Flagyl  -Isolyte at 125 cc/hr  -N p o   -Type and screen  -Pain and nausea control as needed    History of Present Illness     HPI:  Stormy Stockton is a 19 y o  female who presents with abdominal pain  No significant past medical history  She states that this morning around midnight she woke up suddenly with pain in the right upper quadrant  She thinks that on Saturday 6/17 she also had similar pain, but not nearly as bad  No reported exacerbation with eating  No prior episodes of right upper quadrant pain  No flank pain  No hematuria or dysuria  She denies fevers or chills  She notes that she just completed a heart monitor for palpitations, results are still pending  Review of Systems   Constitutional: Negative for chills and fever  Respiratory: Negative for shortness of breath  Cardiovascular: Positive for palpitations  Negative for chest pain  H/o palpitations, was recently wearing a cardiac monitor (results not available yet)   Gastrointestinal: Positive for abdominal pain and nausea  Negative for constipation, diarrhea and vomiting  Genitourinary: Negative for difficulty urinating, dyspareunia, flank pain and hematuria         Historical Information   Past Medical History:   Diagnosis Date   • Asthma    • Urinary tract infection      Past Surgical History:   Procedure Laterality Date   • DENTAL SURGERY     • NO PAST SURGERIES       Social History   Social History     Substance and Sexual Activity   Alcohol Use No Social History     Substance and Sexual Activity   Drug Use No     Social History     Tobacco Use   Smoking Status Never   • Passive exposure: Never   Smokeless Tobacco Never     E-Cigarette/Vaping   • E-Cigarette Use Never User      E-Cigarette/Vaping Substances   • Nicotine Yes      Family History: non-contributory    Meds/Allergies   all medications and allergies reviewed  Allergies   Allergen Reactions   • Penicillins Rash       Objective   First Vitals:   Blood Pressure: 132/76 (06/20/23 0120)  Pulse: 84 (06/20/23 0120)  Temperature: (!) 97 4 °F (36 3 °C) (06/20/23 0120)  Temp Source: Oral (06/20/23 0120)  Respirations: 18 (06/20/23 0120)  SpO2: 100 % (06/20/23 0120)    Current Vitals:   Blood Pressure: 122/72 (06/20/23 1001)  Pulse: 89 (06/20/23 1001)  Temperature: (!) 97 4 °F (36 3 °C) (06/20/23 0120)  Temp Source: Oral (06/20/23 0120)  Respirations: 18 (06/20/23 1001)  SpO2: 100 % (06/20/23 1001)      Intake/Output Summary (Last 24 hours) at 6/20/2023 1052  Last data filed at 6/20/2023 0957  Gross per 24 hour   Intake 1100 ml   Output --   Net 1100 ml       Invasive Devices     Peripheral Intravenous Line  Duration           Peripheral IV 06/20/23 Left Antecubital <1 day                Physical Exam  Vitals reviewed  Constitutional:       General: She is not in acute distress  Appearance: Normal appearance  She is not ill-appearing  Eyes:      General: No scleral icterus  Cardiovascular:      Rate and Rhythm: Normal rate  Pulmonary:      Effort: Pulmonary effort is normal    Abdominal:      Comments: Soft, nondistended, tender to palpation in the right upper quadrant, positive Tripatih sign   Musculoskeletal:         General: Normal range of motion  Right lower leg: No edema  Left lower leg: No edema  Skin:     General: Skin is warm and dry  Coloration: Skin is not jaundiced or pale  Neurological:      Mental Status: She is alert and oriented to person, place, and time  Lab Results:   I have personally reviewed pertinent lab results  , CBC:   Lab Results   Component Value Date    WBC 11 36 (H) 06/20/2023    HGB 12 3 06/20/2023    HCT 38 0 06/20/2023    MCV 86 06/20/2023     06/20/2023    RBC 4 43 06/20/2023    MCH 27 8 06/20/2023    MCHC 32 4 06/20/2023    RDW 12 7 06/20/2023    MPV 9 7 06/20/2023    NRBC 0 06/20/2023   , CMP:   Lab Results   Component Value Date    SODIUM 138 06/20/2023    K 3 2 (L) 06/20/2023     06/20/2023    CO2 25 06/20/2023    BUN 7 06/20/2023    CREATININE 0 66 06/20/2023    CALCIUM 9 3 06/20/2023    AST 14 06/20/2023    ALT 12 06/20/2023    ALKPHOS 75 06/20/2023    EGFR 127 06/20/2023   , Lipase:   Lab Results   Component Value Date    LIPASE 14 06/20/2023     Imaging: I have personally reviewed pertinent reports  and I have personally reviewed pertinent films in PACS  EKG, Pathology, and Other Studies: I have personally reviewed pertinent reports     and I have personally reviewed pertinent films in PACS    Code Status: No Order  Advance Directive and Living Will:      Power of :    POLST:

## 2023-06-20 NOTE — PROGRESS NOTES
Pt requesting something for anxiety  States Xanax doesn't work for her  RN reached out to Dr Issa Barriga to make aware  New order received  Pt and parents at bedside also made aware surgery postponed for tomorrow  Menu given to order clear liquids  Pt without complaints or further concerns at this time  Will continue to monitor

## 2023-06-20 NOTE — ED PROVIDER NOTES
History  Chief Complaint   Patient presents with   • Abdominal Pain     Pt reports RUQ pain that woke her up around midnight  +nausea  Denies V/D     Patient is a 19year old female with intermittent worsening RUQ pain with radiation to R flank for past 2 days  (+) nausea  No vomiting or diarrhea  No fever  No GI bleeding  No urinary sx  No abdominal surgery  Food does not aggrevate pain  Was last seen at [address] [place] [address] in [facility] for palpitations on 6/15/23  LMP- 3 weeks ago  [facility] website checked on this patient and last Rx filled was on 3/10/23 for xanax for 5 day supply  No rash  History provided by:  Patient and parent   used: No    Abdominal Pain  Associated symptoms: nausea    Associated symptoms: no diarrhea, no fever and no vomiting        Prior to Admission Medications   Prescriptions Last Dose Informant Patient Reported? Taking?    ALPRAZolam (XANAX) 0 25 mg tablet  Self No No   Sig: Take 1 tablet (0 25 mg total) by mouth daily at bedtime as needed for anxiety   Xulane 150-35 MCG/24HR  Self No No   Sig: APPLY 1 PATCH ONE TIME PER WEEK   albuterol (PROVENTIL HFA,VENTOLIN HFA) 90 mcg/act inhaler  Self No No   Sig: Inhale 2 puffs every 6 (six) hours as needed for wheezing   escitalopram (Lexapro) 20 mg tablet  Self No No   Sig: Take 1 tablet (20 mg total) by mouth daily   traZODone (DESYREL) 50 mg tablet  Self No No   Sig: Take 1 tablet (50 mg total) by mouth daily at bedtime as needed for sleep      Facility-Administered Medications: None       Past Medical History:   Diagnosis Date   • Asthma    • Urinary tract infection        Past Surgical History:   Procedure Laterality Date   • DENTAL SURGERY     • NO PAST SURGERIES         Family History   Problem Relation Age of Onset   • Heart disease Father    • Hypertension Father    • Diabetes Father    • Coronary artery disease Father    • Hyperlipidemia Father    • Ovarian cancer Maternal Grandmother    • Diabetes Maternal Grandmother    • Arthritis Mother    • No Known Problems Sister      I have reviewed and agree with the history as documented  E-Cigarette/Vaping   • E-Cigarette Use Never User      E-Cigarette/Vaping Substances   • Nicotine Yes      Social History     Tobacco Use   • Smoking status: Never     Passive exposure: Never   • Smokeless tobacco: Never   Vaping Use   • Vaping Use: Never used   Substance Use Topics   • Alcohol use: No   • Drug use: No       Review of Systems   Constitutional: Negative for fever  Gastrointestinal: Positive for abdominal pain and nausea  Negative for blood in stool, diarrhea and vomiting  Genitourinary: Positive for flank pain  Negative for difficulty urinating  Skin: Negative for rash  All other systems reviewed and are negative  Physical Exam  Physical Exam  Vitals and nursing note reviewed  Constitutional:       General: She is in acute distress (moderate)  HENT:      Head: Normocephalic and atraumatic  Mouth/Throat:      Mouth: Mucous membranes are moist    Eyes:      General: No scleral icterus  Cardiovascular:      Rate and Rhythm: Normal rate and regular rhythm  Heart sounds: Normal heart sounds  No murmur heard  Pulmonary:      Effort: Pulmonary effort is normal  No respiratory distress  Breath sounds: Normal breath sounds  Abdominal:      General: Bowel sounds are normal  There is no distension  Palpations: Abdomen is soft  Tenderness: There is abdominal tenderness (RUQ)  There is no right CVA tenderness, left CVA tenderness, guarding or rebound  Musculoskeletal:         General: No deformity  Cervical back: Normal range of motion and neck supple  Right lower leg: No edema  Left lower leg: No edema  Skin:     General: Skin is warm and dry  Coloration: Skin is not jaundiced  Findings: No erythema or rash  Neurological:      General: No focal deficit present        Mental Status: She is alert and oriented to person, place, and time  Psychiatric:         Mood and Affect: Mood normal          Vital Signs  ED Triage Vitals   Temperature Pulse Respirations Blood Pressure SpO2   06/20/23 0120 06/20/23 0120 06/20/23 0120 06/20/23 0120 06/20/23 0120   (!) 97 4 °F (36 3 °C) 84 18 132/76 100 %      Temp Source Heart Rate Source Patient Position - Orthostatic VS BP Location FiO2 (%)   06/20/23 0120 06/20/23 0120 06/20/23 0120 06/20/23 0120 --   Oral Monitor Lying Right arm       Pain Score       06/20/23 0550       6           Vitals:    06/20/23 0120 06/20/23 0230 06/20/23 0500   BP: 132/76  123/63   Pulse: 84 82 95   Patient Position - Orthostatic VS: Lying           Visual Acuity      ED Medications  Medications   levofloxacin (LEVAQUIN) IVPB (premix in dextrose) 750 mg 150 mL (has no administration in time range)   sodium chloride 0 9 % bolus 1,000 mL (1,000 mL Intravenous New Bag 6/20/23 0148)   ondansetron (ZOFRAN) injection 4 mg (4 mg Intravenous Given 6/20/23 0144)   HYDROmorphone (DILAUDID) injection 0 5 mg (0 5 mg Intravenous Given 6/20/23 0210)   potassium chloride oral solution 40 mEq (40 mEq Oral Given 6/20/23 0425)   iohexol (OMNIPAQUE) 350 MG/ML injection (SINGLE-DOSE) 80 mL (80 mL Intravenous Given 6/20/23 0333)   HYDROmorphone (DILAUDID) injection 0 5 mg (0 5 mg Intravenous Given 6/20/23 0550)   metroNIDAZOLE (FLAGYL) IVPB (premix) 500 mg 100 mL (500 mg Intravenous New Bag 6/20/23 0848)       Diagnostic Studies  Results Reviewed     Procedure Component Value Units Date/Time    Lactic acid, plasma (w/reflex if result > 2 0) [[id number]]  (Normal) Collected: 06/20/23 0841    Lab Status: Final result Specimen: Blood from Arm, Right Updated: 06/20/23 0908     LACTIC ACID 0 8 mmol/L     Narrative:      Result may be elevated if tourniquet was used during collection      Roger Kingston [[id number]]  (Normal) Collected: 06/20/23 0841    Lab Status: Final result Specimen: Blood from Arm, Right Updated: 06/20/23 9153 Protime 14 0 seconds      INR 1 06    APTT [[id number]]  (Normal) Collected: 06/20/23 0841    Lab Status: Final result Specimen: Blood from Arm, Right Updated: 06/20/23 0905     PTT 32 seconds     Blood culture #2 [[id number]] Collected: 06/20/23 0841    Lab Status: In process Specimen: Blood from Arm, Left Updated: 06/20/23 0848    Blood culture #1 [[id number]] Collected: 06/20/23 0841    Lab Status:  In process Specimen: Blood from Arm, Right Updated: 06/20/23 0848    POCT pregnancy, urine [[id number]]  (Normal) Resulted: 06/20/23 0227    Lab Status: Final result Specimen: Urine Updated: 06/20/23 0242     EXT Preg Test, Ur Negative     Control Valid    UA (URINE) with reflex to Scope [[id number]] Collected: 06/20/23 0212    Lab Status: Final result Specimen: Urine, Clean Catch Updated: 06/20/23 0235     Color, UA Light Yellow     Clarity, UA Clear     Specific Gravity, UA 1 014     pH, UA 6 5     Leukocytes, UA Negative     Nitrite, UA Negative     Protein, UA Negative mg/dl      Glucose, UA Negative mg/dl      Ketones, UA Negative mg/dl      Urobilinogen, UA <2 0 mg/dl      Bilirubin, UA Negative     Occult Blood, UA Negative    Comprehensive metabolic panel [[id number]]  (Abnormal) Collected: 06/20/23 0148    Lab Status: Final result Specimen: Blood from Arm, Right Updated: 06/20/23 0216     Sodium 138 mmol/L      Potassium 3 2 mmol/L      Chloride 106 mmol/L      CO2 25 mmol/L      ANION GAP 7 mmol/L      BUN 7 mg/dL      Creatinine 0 66 mg/dL      Glucose 96 mg/dL      Calcium 9 3 mg/dL      AST 14 U/L      ALT 12 U/L      Alkaline Phosphatase 75 U/L      Total Protein 6 8 g/dL      Albumin 4 0 g/dL      Total Bilirubin 0 29 mg/dL      eGFR 127 ml/min/1 73sq m     Narrative:      Cassie guidelines for Chronic Kidney Disease (CKD):   •  Stage 1 with normal or high GFR (GFR > 90 mL/min/1 73 square meters)  •  Stage 2 Mild CKD (GFR = 60-89 mL/min/1 73 square meters)  •  Stage 3A Moderate CKD (GFR = 45-59 mL/min/1 73 square meters)  •  Stage 3B Moderate CKD (GFR = 30-44 mL/min/1 73 square meters)  •  Stage 4 Severe CKD (GFR = 15-29 mL/min/1 73 square meters)  •  Stage 5 End Stage CKD (GFR <15 mL/min/1 73 square meters)  Note: GFR calculation is accurate only with a steady state creatinine    Lipase [[id number]]  (Normal) Collected: 06/20/23 0148    Lab Status: Final result Specimen: Blood from Arm, Right Updated: 06/20/23 0216     Lipase 14 u/L     CBC and differential [[id number]]  (Abnormal) Collected: 06/20/23 0148    Lab Status: Final result Specimen: Blood from Arm, Right Updated: 06/20/23 0155     WBC 11 36 Thousand/uL      RBC 4 43 Million/uL      Hemoglobin 12 3 g/dL      Hematocrit 38 0 %      MCV 86 fL      MCH 27 8 pg      MCHC 32 4 g/dL      RDW 12 7 %      MPV 9 7 fL      Platelets 522 Thousands/uL      nRBC 0 /100 WBCs      Neutrophils Relative 56 %      Immat GRANS % 0 %      Lymphocytes Relative 34 %      Monocytes Relative 8 %      Eosinophils Relative 2 %      Basophils Relative 0 %      Neutrophils Absolute 6 30 Thousands/µL      Immature Grans Absolute 0 04 Thousand/uL      Lymphocytes Absolute 3 91 Thousands/µL      Monocytes Absolute 0 88 Thousand/µL      Eosinophils Absolute 0 20 Thousand/µL      Basophils Absolute 0 03 Thousands/µL                  US right upper quadrant   ED Interpretation by Alka Agrawal MD (06/20 1985)   FINDINGS:     PANCREAS:  Visualized portions of the pancreas are within normal limits      AORTA AND IVC:  Visualized portions are normal for patient age      LIVER:  Size:  Within normal range  The liver measures 14 5 cm in the midclavicular line  Contour:  Surface contour is smooth  Parenchyma:  Echogenicity and echotexture are within normal limits  No liver mass identified  Limited imaging of the main portal vein shows it to be patent and hepatopetal      BILIARY:  The gallbladder is mildly distended    Borderline thickened mildly edematous gallbladder wall  No pericholecystic fluid  Nonmobile gallstone in the gallbladder neck  Trace gallbladder sludge  No sonographic Tripathi sign  No intrahepatic biliary dilatation  CBD measures 4 0 mm  No choledocholithiasis      KIDNEY:  Right kidney measures 9 9 x 3 8 x 4 6 cm  Volume 89 9 mL  Kidney within normal limits      ASCITES:   None      IMPRESSION:     Nonmobile gallstone in the gallbladder neck and trace gallbladder sludge wi   th additional ultrasound and CT findings compatible with mild or early acute cholecystitis in the appropriate clinical context  However, sonographer reports a negative sonographic   Tripathi sign  Correlate with clinical findings  Consider follow-up with a HIDA scan if it would alter patient management      No biliary dilation      Remainder of the examination is normal     The study was marked in [facility] for immediate notification      Workstation performed: [id number]      Final Result by Sarah Davila MD (06/20 0446)      Nonmobile gallstone in the gallbladder neck and trace gallbladder sludge with additional ultrasound and CT findings compatible with mild or early acute cholecystitis in the appropriate clinical context  However, sonographer reports a negative sonographic    Tripathi sign  Correlate with clinical findings  Consider follow-up with a HIDA scan if it would alter patient management  No biliary dilation  Remainder of the examination is normal      The study was marked in EPIC for immediate notification  Workstation performed: [id number]         CT abdomen pelvis with contrast   ED Interpretation by Lucy Corcoran MD (06/20 4309)   FINDINGS:     ABDOMEN     LOWER CHEST:  No clinically significant abnormality identified in the visualized lower chest      LIVER/BILIARY TREE: Mild intrahepatic biliary ductal dilation   No suspicious hepatic lesion      GALLBLADDER: Mild pericholecystic inflammation, however, no gallstones are identified      SPLEEN:  Unremarkable      PANCREAS:  Unremarkable      ADRENAL GLANDS:  Unremarkable      KIDNEYS/URETERS:  Unremarkable  No hydronephrosis      STOMACH AND BOWEL:  Unremarkable      APPENDIX:  A normal appendix was visualized      ABDOMINOPELVIC CAVITY:  No ascites  No pneumoperitoneum  No lymphadenopathy      VESSELS:  Unremarkable for patient's age      PELVIS     REPRODUCTIVE ORGANS:  Unremarkable for patient's age      URINARY BLADDER:  Unremarkable      ABDOMINAL WALL/INGUINAL REGIONS:  Unremarkable      OSSEOUS STRUCTURES:  No acute fracture or destructive osseous lesion      IMPRESSION:     Mild intrahepatic biliary ductal dilation  Mild pericholecystic inflammatory c   hange  No radiopaque gallstones identified  Findings are nonspecific  Correlate with clinical presentation and laboratory findings  If there is concern for acute cholecystitis,   recommend right upper quadrant ultrasound         The study was marked in [facility] for immediate notification      Workstation performed: [id number]         Final Result by Cathy Johnson DO (06/20 0354)      Mild intrahepatic biliary ductal dilation  Mild pericholecystic inflammatory change  No radiopaque gallstones identified  Findings are nonspecific  Correlate with clinical presentation and laboratory findings  If there is concern for acute cholecystitis,    recommend right upper quadrant ultrasound  The study was marked in [facility] for immediate notification  Workstation performed: [id number]                    Procedures  Procedures         ED Course  ED Course as of 06/20/23 0923   Tue Jun 20, 2023   0249 Potassium(!): 3 2  K-dur po ordered  0327 Labs d/w patient and father with patient's permission  Pain improved  0403 CT d/w patient and father  RUQ US ordered for AM     0531 More IV dilaudid ordered for recurring pain     6220 US d/w patient and father   in RUQ  Declines pain medication at this time   Does not know if "she can take cephalosporin so will order IV levaquin and flagyl  Will consult surgery  2323 Signed out to Dr Tash Duran this AM that patient is pending surgical evaluation/dispositioning  YANETH    Flowsheet Row Most Recent Value   YANETH Initial Screen: During the past 12 months, did you:    1  Drink any alcohol (more than a few sips)? No Filed at: 06/20/2023 0145   2  Smoke any marijuana or hashish No Filed at: 06/20/2023 0145   3  Use anything else to get high? (\"anything else\" includes illegal drugs, over the counter and prescription drugs, and things that you sniff or 'forrest')? No Filed at: 06/20/2023 0145                         Initial Sepsis Screening     Row Name 06/20/23 0851                Is the patient's history suggestive of a new or worsening infection? Yes (Proceed)  -AO        Suspected source of infection acute abdominal infection  -AO        Indicate SIRS criteria Tachycardia > 90 bpm  -AO        Are two or more of the above signs & symptoms of infection both present and new to the patient? No  -AO              User Key  (r) = Recorded By, (t) = Taken By, (c) = Cosigned By    [address] Name Provider Eileen Lopez MD Physician                              Medical Decision Making  DDx including but not limited to: appendicitis, gastroenteritis, gastritis, PUD, GERD, gastroparesis, hepatitis, pancreatitis, colitis, enteritis, food poisoning, mesenteric adenitis, epiploic appendagitis, IBD, IBS, ileus, bowel obstruction, volvulus, cholecystitis, biliary colic, choledocholithiasis, perforated viscus, tumor, splenic etiology, diverticulitis, internal hernia, constipation, pelvic pathology, renal colic, pyelonephritis, UTI  Amount and/or Complexity of Data Reviewed  Labs: ordered  Decision-making details documented in ED Course  Radiology: ordered  Decision-making details documented in ED Course  Risk  Prescription drug management    Parenteral controlled " substances  Decision regarding hospitalization  Disposition  Final diagnoses:   Cholecystitis     Time reflects when diagnosis was documented in both MDM as applicable and the Disposition within this note     Time User Action Codes Description Comment    6/20/2023  8:26 AM Camilla Kim Rosina [K81 [id number]] Cholecystitis       ED Disposition     None      Follow-up Information    None         Patient's Medications   Discharge Prescriptions    No medications on file       No discharge procedures on file      PDMP Review       Value Time User    PDMP Reviewed  Yes 6/20/2023  1:51 AM Emmy Harris MD          ED Provider  Electronically Signed by           Emmy Harris MD  06/20/23 7020

## 2023-06-20 NOTE — ED NOTES
Contacted radiology of patient's order for US, was informed the morning tech will be in around 30mins and will work on the order       Cris Han RN  06/20/23 4386

## 2023-06-20 NOTE — SEPSIS NOTE
Sepsis Note   Letty Marin 19 y o  female MRN: [id number]  Unit/Bed#: ED-07 Encounter: [id number]       Initial Sepsis Screening     Row Name 06/20/23 0481                Is the patient's history suggestive of a new or worsening infection? Yes (Proceed)  -AO        Suspected source of infection acute abdominal infection  -AO        Indicate SIRS criteria Tachycardia > 90 bpm  -AO        Are two or more of the above signs & symptoms of infection both present and new to the patient? No  -AO              User Key  (r) = Recorded By, (t) = Taken By, (c) = Cosigned By    [address] Name Provider Lacie Mckinney MD Physician                    There is no height or weight on file to calculate BMI    Wt Readings from Last 1 Encounters:   06/15/23 72 1 kg (159 lb)        Ideal body weight: 51 3 kg (112 lb 15 8 oz)  Adjusted ideal body weight: 59 6 kg (131 lb 6 3 oz)

## 2023-06-21 ENCOUNTER — ANESTHESIA (OUTPATIENT)
Dept: PERIOP | Facility: HOSPITAL | Age: 18
End: 2023-06-21
Payer: COMMERCIAL

## 2023-06-21 ENCOUNTER — ANESTHESIA EVENT (OUTPATIENT)
Dept: PERIOP | Facility: HOSPITAL | Age: 18
End: 2023-06-21
Payer: COMMERCIAL

## 2023-06-21 VITALS
HEART RATE: 100 BPM | RESPIRATION RATE: 18 BRPM | SYSTOLIC BLOOD PRESSURE: 124 MMHG | TEMPERATURE: 98 F | DIASTOLIC BLOOD PRESSURE: 57 MMHG | OXYGEN SATURATION: 100 %

## 2023-06-21 DIAGNOSIS — K80.10 CALCULOUS CHOLECYSTITIS: Primary | ICD-10-CM

## 2023-06-21 PROBLEM — K80.00 ACUTE CHOLECYSTITIS DUE TO BILIARY CALCULUS: Status: ACTIVE | Noted: 2023-06-21

## 2023-06-21 PROBLEM — J45.20 MILD INTERMITTENT ASTHMA: Status: ACTIVE | Noted: 2023-06-21

## 2023-06-21 LAB
ALBUMIN SERPL BCP-MCNC: 3.4 G/DL (ref 3.5–5)
ALP SERPL-CCNC: 65 U/L (ref 34–104)
ALT SERPL W P-5'-P-CCNC: 9 U/L (ref 7–52)
ANION GAP SERPL CALCULATED.3IONS-SCNC: 5 MMOL/L
AST SERPL W P-5'-P-CCNC: 13 U/L (ref 13–39)
BILIRUB SERPL-MCNC: 0.36 MG/DL (ref 0.2–1)
BUN SERPL-MCNC: 5 MG/DL (ref 5–25)
CALCIUM ALBUM COR SERPL-MCNC: 9.1 MG/DL (ref 8.3–10.1)
CALCIUM SERPL-MCNC: 8.6 MG/DL (ref 8.4–10.2)
CHLORIDE SERPL-SCNC: 107 MMOL/L (ref 96–108)
CO2 SERPL-SCNC: 27 MMOL/L (ref 21–32)
CREAT SERPL-MCNC: 0.69 MG/DL (ref 0.6–1.3)
ERYTHROCYTE [DISTWIDTH] IN BLOOD BY AUTOMATED COUNT: 12.8 % (ref 11.6–15.1)
GFR SERPL CREATININE-BSD FRML MDRD: 125 ML/MIN/1.73SQ M
GLUCOSE P FAST SERPL-MCNC: 87 MG/DL (ref 65–99)
GLUCOSE SERPL-MCNC: 87 MG/DL (ref 65–140)
HCT VFR BLD AUTO: 36.1 % (ref 34.8–46.1)
HGB BLD-MCNC: 11.7 G/DL (ref 11.5–15.4)
MAGNESIUM SERPL-MCNC: 1.7 MG/DL (ref 1.9–2.7)
MCH RBC QN AUTO: 28.3 PG (ref 26.8–34.3)
MCHC RBC AUTO-ENTMCNC: 32.4 G/DL (ref 31.4–37.4)
MCV RBC AUTO: 87 FL (ref 82–98)
PHOSPHATE SERPL-MCNC: 3.9 MG/DL (ref 2.7–4.5)
PLATELET # BLD AUTO: 285 THOUSANDS/UL (ref 149–390)
PMV BLD AUTO: 9.8 FL (ref 8.9–12.7)
POTASSIUM SERPL-SCNC: 3.9 MMOL/L (ref 3.5–5.3)
PROT SERPL-MCNC: 5.9 G/DL (ref 6.4–8.4)
RBC # BLD AUTO: 4.13 MILLION/UL (ref 3.81–5.12)
SODIUM SERPL-SCNC: 139 MMOL/L (ref 135–147)
WBC # BLD AUTO: 6.44 THOUSAND/UL (ref 4.31–10.16)

## 2023-06-21 PROCEDURE — 85027 COMPLETE CBC AUTOMATED: CPT

## 2023-06-21 PROCEDURE — 88304 TISSUE EXAM BY PATHOLOGIST: CPT | Performed by: PATHOLOGY

## 2023-06-21 PROCEDURE — 84100 ASSAY OF PHOSPHORUS: CPT

## 2023-06-21 PROCEDURE — NC001 PR NO CHARGE: Performed by: SURGERY

## 2023-06-21 PROCEDURE — 83735 ASSAY OF MAGNESIUM: CPT

## 2023-06-21 PROCEDURE — 80053 COMPREHEN METABOLIC PANEL: CPT

## 2023-06-21 RX ORDER — BUPIVACAINE HYDROCHLORIDE AND EPINEPHRINE 2.5; 5 MG/ML; UG/ML
INJECTION, SOLUTION EPIDURAL; INFILTRATION; INTRACAUDAL; PERINEURAL AS NEEDED
Status: DISCONTINUED | OUTPATIENT
Start: 2023-06-21 | End: 2023-06-21 | Stop reason: HOSPADM

## 2023-06-21 RX ORDER — HYDROCODONE BITARTRATE AND ACETAMINOPHEN 5; 325 MG/1; MG/1
1 TABLET ORAL EVERY 6 HOURS PRN
Qty: 10 TABLET | Refills: 0 | Status: SHIPPED | OUTPATIENT
Start: 2023-06-21

## 2023-06-21 RX ORDER — MAGNESIUM SULFATE HEPTAHYDRATE 40 MG/ML
2 INJECTION, SOLUTION INTRAVENOUS ONCE
Status: COMPLETED | OUTPATIENT
Start: 2023-06-21 | End: 2023-06-21

## 2023-06-21 RX ORDER — HYDROMORPHONE HCL/PF 1 MG/ML
0.4 SYRINGE (ML) INJECTION
Status: DISCONTINUED | OUTPATIENT
Start: 2023-06-21 | End: 2023-06-21

## 2023-06-21 RX ORDER — FENTANYL CITRATE/PF 50 MCG/ML
50 SYRINGE (ML) INJECTION
Status: DISCONTINUED | OUTPATIENT
Start: 2023-06-21 | End: 2023-06-21

## 2023-06-21 RX ORDER — NEOSTIGMINE METHYLSULFATE 1 MG/ML
INJECTION INTRAVENOUS AS NEEDED
Status: DISCONTINUED | OUTPATIENT
Start: 2023-06-21 | End: 2023-06-21

## 2023-06-21 RX ORDER — MAGNESIUM HYDROXIDE 1200 MG/15ML
LIQUID ORAL AS NEEDED
Status: DISCONTINUED | OUTPATIENT
Start: 2023-06-21 | End: 2023-06-21 | Stop reason: HOSPADM

## 2023-06-21 RX ORDER — MIDAZOLAM HYDROCHLORIDE 2 MG/2ML
INJECTION, SOLUTION INTRAMUSCULAR; INTRAVENOUS AS NEEDED
Status: DISCONTINUED | OUTPATIENT
Start: 2023-06-21 | End: 2023-06-21

## 2023-06-21 RX ORDER — HYDROCODONE BITARTRATE AND ACETAMINOPHEN 5; 325 MG/1; MG/1
1 TABLET ORAL EVERY 4 HOURS PRN
Qty: 12 TABLET | Refills: 0 | Status: SHIPPED | OUTPATIENT
Start: 2023-06-21 | End: 2023-06-21

## 2023-06-21 RX ORDER — SODIUM CHLORIDE, SODIUM LACTATE, POTASSIUM CHLORIDE, CALCIUM CHLORIDE 600; 310; 30; 20 MG/100ML; MG/100ML; MG/100ML; MG/100ML
75 INJECTION, SOLUTION INTRAVENOUS CONTINUOUS
Status: DISCONTINUED | OUTPATIENT
Start: 2023-06-21 | End: 2023-06-21 | Stop reason: HOSPADM

## 2023-06-21 RX ORDER — MEPERIDINE HYDROCHLORIDE 25 MG/ML
12.5 INJECTION INTRAMUSCULAR; INTRAVENOUS; SUBCUTANEOUS
Status: DISCONTINUED | OUTPATIENT
Start: 2023-06-21 | End: 2023-06-21

## 2023-06-21 RX ORDER — HYDROMORPHONE HCL/PF 1 MG/ML
SYRINGE (ML) INJECTION AS NEEDED
Status: DISCONTINUED | OUTPATIENT
Start: 2023-06-21 | End: 2023-06-21

## 2023-06-21 RX ORDER — DEXAMETHASONE SODIUM PHOSPHATE 10 MG/ML
INJECTION, SOLUTION INTRAMUSCULAR; INTRAVENOUS AS NEEDED
Status: DISCONTINUED | OUTPATIENT
Start: 2023-06-21 | End: 2023-06-21

## 2023-06-21 RX ORDER — PROPOFOL 10 MG/ML
INJECTION, EMULSION INTRAVENOUS AS NEEDED
Status: DISCONTINUED | OUTPATIENT
Start: 2023-06-21 | End: 2023-06-21

## 2023-06-21 RX ORDER — FENTANYL CITRATE 50 UG/ML
INJECTION, SOLUTION INTRAMUSCULAR; INTRAVENOUS AS NEEDED
Status: DISCONTINUED | OUTPATIENT
Start: 2023-06-21 | End: 2023-06-21

## 2023-06-21 RX ORDER — GLYCOPYRROLATE 0.2 MG/ML
INJECTION INTRAMUSCULAR; INTRAVENOUS AS NEEDED
Status: DISCONTINUED | OUTPATIENT
Start: 2023-06-21 | End: 2023-06-21

## 2023-06-21 RX ORDER — ONDANSETRON 2 MG/ML
4 INJECTION INTRAMUSCULAR; INTRAVENOUS ONCE AS NEEDED
Status: DISCONTINUED | OUTPATIENT
Start: 2023-06-21 | End: 2023-06-21

## 2023-06-21 RX ORDER — KETOROLAC TROMETHAMINE 30 MG/ML
INJECTION, SOLUTION INTRAMUSCULAR; INTRAVENOUS AS NEEDED
Status: DISCONTINUED | OUTPATIENT
Start: 2023-06-21 | End: 2023-06-21

## 2023-06-21 RX ORDER — ONDANSETRON 2 MG/ML
INJECTION INTRAMUSCULAR; INTRAVENOUS AS NEEDED
Status: DISCONTINUED | OUTPATIENT
Start: 2023-06-21 | End: 2023-06-21

## 2023-06-21 RX ORDER — ROCURONIUM BROMIDE 10 MG/ML
INJECTION, SOLUTION INTRAVENOUS AS NEEDED
Status: DISCONTINUED | OUTPATIENT
Start: 2023-06-21 | End: 2023-06-21

## 2023-06-21 RX ORDER — ONDANSETRON 2 MG/ML
4 INJECTION INTRAMUSCULAR; INTRAVENOUS EVERY 6 HOURS PRN
Status: DISCONTINUED | OUTPATIENT
Start: 2023-06-21 | End: 2023-06-21 | Stop reason: HOSPADM

## 2023-06-21 RX ORDER — PROMETHAZINE HYDROCHLORIDE 25 MG/ML
25 INJECTION, SOLUTION INTRAMUSCULAR; INTRAVENOUS ONCE AS NEEDED
Status: DISCONTINUED | OUTPATIENT
Start: 2023-06-21 | End: 2023-06-21

## 2023-06-21 RX ORDER — ALBUTEROL SULFATE 2.5 MG/3ML
2.5 SOLUTION RESPIRATORY (INHALATION) ONCE AS NEEDED
Status: COMPLETED | OUTPATIENT
Start: 2023-06-21 | End: 2023-06-21

## 2023-06-21 RX ORDER — LIDOCAINE HYDROCHLORIDE 10 MG/ML
INJECTION, SOLUTION EPIDURAL; INFILTRATION; INTRACAUDAL; PERINEURAL AS NEEDED
Status: DISCONTINUED | OUTPATIENT
Start: 2023-06-21 | End: 2023-06-21

## 2023-06-21 RX ADMIN — SODIUM CHLORIDE, SODIUM LACTATE, POTASSIUM CHLORIDE, AND CALCIUM CHLORIDE 125 ML/HR: .6; .31; .03; .02 INJECTION, SOLUTION INTRAVENOUS at 07:27

## 2023-06-21 RX ADMIN — ALBUTEROL SULFATE 2.5 MG: 2.5 SOLUTION RESPIRATORY (INHALATION) at 17:22

## 2023-06-21 RX ADMIN — GLYCOPYRROLATE 0.4 MG: 0.2 INJECTION, SOLUTION INTRAMUSCULAR; INTRAVENOUS at 17:05

## 2023-06-21 RX ADMIN — METRONIDAZOLE 500 MG: 500 INJECTION, SOLUTION INTRAVENOUS at 01:13

## 2023-06-21 RX ADMIN — CEFAZOLIN SODIUM 1000 MG: 1 SOLUTION INTRAVENOUS at 11:12

## 2023-06-21 RX ADMIN — ONDANSETRON 4 MG: 2 INJECTION INTRAMUSCULAR; INTRAVENOUS at 18:49

## 2023-06-21 RX ADMIN — ONDANSETRON 4 MG: 2 INJECTION INTRAMUSCULAR; INTRAVENOUS at 11:38

## 2023-06-21 RX ADMIN — MIDAZOLAM HYDROCHLORIDE 2 MG: 1 INJECTION, SOLUTION INTRAMUSCULAR; INTRAVENOUS at 16:02

## 2023-06-21 RX ADMIN — METRONIDAZOLE: 500 INJECTION, SOLUTION INTRAVENOUS at 16:14

## 2023-06-21 RX ADMIN — FENTANYL CITRATE 50 MCG: 50 INJECTION, SOLUTION INTRAMUSCULAR; INTRAVENOUS at 16:36

## 2023-06-21 RX ADMIN — DEXAMETHASONE SODIUM PHOSPHATE 5 MG: 10 INJECTION, SOLUTION INTRAMUSCULAR; INTRAVENOUS at 16:09

## 2023-06-21 RX ADMIN — OXYCODONE HYDROCHLORIDE 5 MG: 5 TABLET ORAL at 18:17

## 2023-06-21 RX ADMIN — CEFAZOLIN SODIUM 1000 MG: 1 SOLUTION INTRAVENOUS at 03:23

## 2023-06-21 RX ADMIN — CEFAZOLIN SODIUM 1000 MG: 1 SOLUTION INTRAVENOUS at 16:11

## 2023-06-21 RX ADMIN — KETOROLAC TROMETHAMINE 15 MG: 30 INJECTION, SOLUTION INTRAMUSCULAR at 16:45

## 2023-06-21 RX ADMIN — OXYCODONE HYDROCHLORIDE 5 MG: 5 TABLET ORAL at 01:24

## 2023-06-21 RX ADMIN — ACETAMINOPHEN 650 MG: 325 TABLET, FILM COATED ORAL at 04:57

## 2023-06-21 RX ADMIN — ROCURONIUM BROMIDE 40 MG: 10 SOLUTION INTRAVENOUS at 16:09

## 2023-06-21 RX ADMIN — HYDROMORPHONE HYDROCHLORIDE 0.5 MG: 1 INJECTION, SOLUTION INTRAMUSCULAR; INTRAVENOUS; SUBCUTANEOUS at 17:06

## 2023-06-21 RX ADMIN — PROPOFOL 200 MG: 10 INJECTION, EMULSION INTRAVENOUS at 16:09

## 2023-06-21 RX ADMIN — METRONIDAZOLE 500 MG: 500 INJECTION, SOLUTION INTRAVENOUS at 08:22

## 2023-06-21 RX ADMIN — SODIUM CHLORIDE, SODIUM LACTATE, POTASSIUM CHLORIDE, AND CALCIUM CHLORIDE: .6; .31; .03; .02 INJECTION, SOLUTION INTRAVENOUS at 16:38

## 2023-06-21 RX ADMIN — SODIUM CHLORIDE, SODIUM LACTATE, POTASSIUM CHLORIDE, AND CALCIUM CHLORIDE: .6; .31; .03; .02 INJECTION, SOLUTION INTRAVENOUS at 16:05

## 2023-06-21 RX ADMIN — NEOSTIGMINE METHYLSULFATE 4 MG: 1 INJECTION INTRAVENOUS at 17:05

## 2023-06-21 RX ADMIN — FENTANYL CITRATE 50 MCG: 50 INJECTION, SOLUTION INTRAMUSCULAR; INTRAVENOUS at 16:09

## 2023-06-21 RX ADMIN — LIDOCAINE HYDROCHLORIDE 50 MG: 10 INJECTION, SOLUTION EPIDURAL; INFILTRATION; INTRACAUDAL; PERINEURAL at 16:09

## 2023-06-21 RX ADMIN — DIAZEPAM 5 MG: 5 TABLET ORAL at 05:06

## 2023-06-21 RX ADMIN — DIAZEPAM 5 MG: 5 TABLET ORAL at 14:13

## 2023-06-21 RX ADMIN — MAGNESIUM SULFATE HEPTAHYDRATE 2 G: 40 INJECTION, SOLUTION INTRAVENOUS at 09:29

## 2023-06-21 RX ADMIN — ONDANSETRON 4 MG: 2 INJECTION INTRAMUSCULAR; INTRAVENOUS at 16:09

## 2023-06-21 NOTE — PLAN OF CARE
Problem: PAIN - ADULT  Goal: Verbalizes/displays adequate comfort level or baseline comfort level  Description: Interventions:  - Encourage patient to monitor pain and request assistance  - Assess pain using appropriate pain scale  - Administer analgesics based on type and severity of pain and evaluate response  - Implement non-pharmacological measures as appropriate and evaluate response  - Consider cultural and social influences on pain and pain management  - Notify physician/advanced practitioner if interventions unsuccessful or patient reports new pain  Outcome: Progressing     Problem: INFECTION - ADULT  Goal: Absence or prevention of progression during hospitalization  Description: INTERVENTIONS:  - Assess and monitor for signs and symptoms of infection  - Monitor lab/diagnostic results  - Monitor all insertion sites, i e  indwelling lines, tubes, and drains  - Monitor endotracheal if appropriate and nasal secretions for changes in amount and color  - Toutle appropriate cooling/warming therapies per order  - Administer medications as ordered  - Instruct and encourage patient and family to use good hand hygiene technique  - Identify and instruct in appropriate isolation precautions for identified infection/condition  Outcome: Progressing  Goal: Absence of fever/infection during neutropenic period  Description: INTERVENTIONS:  - Monitor WBC    Outcome: Progressing     Problem: SAFETY ADULT  Goal: Patient will remain free of falls  Description: INTERVENTIONS:  - Educate patient/family on patient safety including physical limitations  - Instruct patient to call for assistance with activity   - Consult OT/PT to assist with strengthening/mobility   - Keep Call bell within reach  - Keep bed low and locked with side rails adjusted as appropriate  - Keep care items and personal belongings within reach  - Initiate and maintain comfort rounds  - Make Fall Risk Sign visible to staff  - Apply yellow socks and bracelet for high fall risk patients  - Consider moving patient to room near nurses station  Outcome: Progressing  Goal: Maintain or return to baseline ADL function  Description: INTERVENTIONS:  -  Assess patient's ability to carry out ADLs; assess patient's baseline for ADL function and identify physical deficits which impact ability to perform ADLs (bathing, care of mouth/teeth, toileting, grooming, dressing, etc )  - Assess/evaluate cause of self-care deficits   - Assess range of motion  - Assess patient's mobility; develop plan if impaired  - Assess patient's need for assistive devices and provide as appropriate  - Encourage maximum independence but intervene and supervise when necessary  - Involve family in performance of ADLs  - Assess for home care needs following discharge   - Consider OT consult to assist with ADL evaluation and planning for discharge  - Provide patient education as appropriate  Outcome: Progressing  Goal: Maintains/Returns to pre admission functional level  Description: INTERVENTIONS:  - Perform BMAT or MOVE assessment daily    - Set and communicate daily mobility goal to care team and patient/family/caregiver     - Collaborate with rehabilitation services on mobility goals if consulted  - Perform Range of Motion   - Reposition patient   - Dangle patient   - Stand patient   - Ambulate patient   - Out of bed to chair   - Out of bed for meals   - Out of bed for toileting  - Record patient progress and toleration of activity level   Outcome: Progressing     Problem: GASTROINTESTINAL - ADULT  Goal: Minimal or absence of nausea and/or vomiting  Description: INTERVENTIONS:  - Administer IV fluids if ordered to ensure adequate hydration  - Maintain NPO status until nausea and vomiting are resolved  - Nasogastric tube if ordered  - Administer ordered antiemetic medications as needed  - Provide nonpharmacologic comfort measures as appropriate  - Advance diet as tolerated, if ordered  - Consider nutrition services referral to assist patient with adequate nutrition and appropriate food choices  Outcome: Progressing

## 2023-06-21 NOTE — OP NOTE
OPERATIVE REPORT  PATIENT NAME: Bo Engel    :  2005  MRN: [id number]  Pt Location: [facility] OR ROOM 01    SURGERY DATE: 2023    Surgeon(s) and Role:     * Faustina Felty, MD - Primary     * Skyla Green MD - Assisting    Preop Diagnosis:  Cholecystitis [K81 9]    Post-Op Diagnosis Codes:     * Cholecystitis [K81 9]    Procedure(s):  CHOLECYSTECTOMY LAPAROSCOPIC: POSSIBLE OPEN    Specimen(s):  ID Type Source Tests Collected by Time Destination   1 :  Tissue Gallbladder TISSUE EXAM Faustina Felty, MD 2023  4:33 PM        Estimated Blood Loss:   Minimal    Drains:  * No LDAs found *    Anesthesia Type:   General    Operative Indications:  Cholecystitis [K81 9]    Independent, non-smoker, ASA 2, wound class II, BMI 29, weight 160, height 63  (+) Generalized anxiety disorder   (+) Panic attacks       PULMONARY   (+) Mild intermittent asthma           Complications:   None    Procedure and Technique:  Bo Engel is a 19 y o  female was brought into the operative suite and identified visually and by arm band  The patient was placed in the supine position  Careful attention towards positioning of extremities was completed  After sterile prep and drape a timeout was completed  Athrombic pumps in place  Antibiotics provided  After instillation of local analgesia an incision was made at the umbilicus   With blunt dissection the peritoneum was identified  This was pulled upward using Kocher clamps  An incision was made  Hemostat was used to bluntly puncture the peritoneum  Intra-abdominal location was verified  A trocar was then inserted  CO2 was then insufflated with a back pressure of 15  After Marcaine instillation at each additional site, additional trochars are placed under direct vision into the upper aspect of the abdomen  Laparoscopic visualization revealed a normal liver and normal stomach      There was edema of the gallbladder suggestive of acute calculus cholecystitis  The gallbladder was pulled with traction inferiorly, and the liver was pushed superior  A dome down technique was completed using electrocautery  This technique carried down to the level of Pérez's pouch  Careful attention was made towards location of the right hepatic artery, cystic artery, common bile duct, right hepatic duct and the cystic duct  Careful attention was made towards the critical anatomy at this region  The cystic duct was identified inserting into the base of the gallbladder  Cystic artery was identified also inserting into the base of the gallbladder  The cystic duct was then clipped ×3 and divided  The cystic artery was clipped ×3 and divided  The gallbladder was then removed from the liver bed with additional electrocautery  The area was copiously irrigated  Hemostasis was assured  The gallbladder was placed into an Endo-Catch bag, and was then removed through the umbilical incision  Fascia was closed with 0 Vicryl suture  The subcutaneous components were irrigated  The subcuticular incisions were then closed  Histacryl was then applied  The patient was awakened from general anesthesia and transferred to the recovery room in stable condition  Sponge and instrument count correct ×2  A postoperative deep briefing was completed  I was present for the entire procedure      Patient Disposition:  PACU         SIGNATURE: Sam Mims MD  DATE: June 21, 2023  TIME: 5:17 PM

## 2023-06-21 NOTE — PROGRESS NOTES
"Progress Note    Taryn Jaime 19 y o  female MRN: [id number]  Unit/Bed#: -01 Encounter: [id number]    Assessment:  23-year-old female with acute cholecystitis  Hemodynamically stable, afebrile  Plan:  - OR for laparoscopic cholecystectomy today  - Keep n p o   - IVF crystalloids  - Continue Ancef/Flagyl   - As needed analgesia and antiemetics  Subjective:  - No new complaints this morning   - Pain well controlled  Objective:     Vitals: Temp:  [97 7 °F (36 5 °C)-98 3 °F (36 8 °C)] 97 7 °F (36 5 °C)  HR:  [] 95  Resp:  [17-19] 19  BP: (122-129)/(61-82) 124/73  There is no height or weight on file to calculate BMI  I/O       06/19 0701  06/20 0700 06/20 0701  06/21 0700 06/21 0701  06/22 0700    I  V    980    IV Piggyback  1250     Total Intake  1250 980    Net  +1250 +980                 Physical Exam:  GEN: NAD  HEENT: Atraumatic  CV: RRR  Lung: Normal effort  Ab: Soft, RUQ tenderness  Nondistended  Extrem: No CCE  Neuro: A+Ox3    Lab, Imaging and other studies:   I have personally reviewed pertinent reports    , CBC with diff:   Lab Results   Component Value Date    WBC 6 44 06/21/2023    HGB 11 7 06/21/2023    HCT 36 1 06/21/2023    MCV 87 06/21/2023     06/21/2023    RBC 4 13 06/21/2023    MCH 28 3 06/21/2023    MCHC 32 4 06/21/2023    RDW 12 8 06/21/2023    MPV 9 8 06/21/2023   , BMP/CMP:   Lab Results   Component Value Date    SODIUM 139 06/21/2023    K 3 9 06/21/2023     06/21/2023    CO2 27 06/21/2023    BUN 5 06/21/2023    CREATININE 0 69 06/21/2023    CALCIUM 8 6 06/21/2023    AST 13 06/21/2023    ALT 9 06/21/2023    ALKPHOS 65 06/21/2023    EGFR 125 06/21/2023   , Magnesium: No components found for: \"MAG\"  VTE Pharmacologic Prophylaxis: Heparin  VTE Mechanical Prophylaxis: sequential compression device      "

## 2023-06-21 NOTE — ANESTHESIA PREPROCEDURE EVALUATION
Procedure:  CHOLECYSTECTOMY LAPAROSCOPIC: POSSIBLE OPEN (Abdomen)    Relevant Problems   CARDIO (within normal limits)      NEURO/PSYCH   (+) Generalized anxiety disorder   (+) Panic attacks      PULMONARY   (+) Mild intermittent asthma        Physical Exam    Airway    Mallampati score: I  TM Distance: >3 FB  Neck ROM: full     Dental   No notable dental hx     Cardiovascular      Pulmonary      Other Findings        Anesthesia Plan  ASA Score- 2     Anesthesia Type- general with ASA Monitors  Additional Monitors:   Airway Plan: ETT  Plan Factors-Exercise tolerance (METS): >4 METS  Chart reviewed  Existing labs reviewed  Patient summary reviewed  Induction- intravenous  Postoperative Plan- Plan for postoperative opioid use  Informed Consent- Anesthetic plan and risks discussed with patient  I personally reviewed this patient with the CRNA  Discussed and agreed on the Anesthesia Plan with the CRNA  Valentino Crain

## 2023-06-21 NOTE — ANESTHESIA POSTPROCEDURE EVALUATION
Post-Op Assessment Note    CV Status:  Stable    Pain management: adequate     Mental Status:  Alert and awake   Hydration Status:  Euvolemic   PONV Controlled:  Controlled   Airway Patency:  Patent (Pt  having some audible wheezing  Albuterol ordered )      Post Op Vitals Reviewed: Yes      Staff: CRNA         No notable events documented      BP   130/83   Temp   97 0   Pulse  105   Resp   18   SpO2   100

## 2023-06-21 NOTE — DISCHARGE INSTR - AVS FIRST PAGE
Please call the office when you leave to schedule an appointment for 2 weeks  Please call [phone]                      [address], [address], [facility], [address]  Off of [address] between [facility] and [facility]  Activity:    May lift 10 lb as many times as desired the 1st week,       20 lb in 2 weeks,       30 lb in 3 weeks  Walking is encouraged  Normal daily activities including climbing steps are okay  Do not engage in strenuous activity ( sit-ups or crutches) or contact sports for 4-6 weeks post-operatively    Return to Work:   Okay to return to work when you feel well if you desire  Diet:   You may return to your normal healthy diet  Wound Care: Your wound is closed with dissolvable stitches and glue  It is okay to shower  Wash incision gently with soap and water and pat dry  Do not soak incisions in bath water or swim for two weeks  Do not apply any creams or ointments  Pain Medication:   Please take as directed if needed  May use Advil or Motrin in addition  Recall, the pain medicine and anesthesia is associated with constipation  No driving while taking narcotic pain medications  Other: It is normal to developed a “healing ridge” / firm incision after surgery  This is your body making scar tissue  It is a good sign  Constipation is very common after general anesthesia  Please use milk of magnesia as needed in order to help prevent constipation  It is normal to get bruising after surgery  If you have questions after discharge please call the office      If you have increased pain, fever >101 5, increased drainage, redness or a bad smell at your surgery site, please call us immediately or come directly to the Emergency Room

## 2023-06-23 ENCOUNTER — TRANSITIONAL CARE MANAGEMENT (OUTPATIENT)
Dept: FAMILY MEDICINE CLINIC | Facility: CLINIC | Age: 18
End: 2023-06-23

## 2023-06-23 ENCOUNTER — TREATMENT (OUTPATIENT)
Dept: SURGERY | Facility: CLINIC | Age: 18
End: 2023-06-23

## 2023-06-23 ENCOUNTER — TELEPHONE (OUTPATIENT)
Dept: FAMILY MEDICINE CLINIC | Facility: CLINIC | Age: 18
End: 2023-06-23

## 2023-06-23 DIAGNOSIS — K80.00 ACUTE CHOLECYSTITIS DUE TO BILIARY CALCULUS: Primary | ICD-10-CM

## 2023-06-23 RX ORDER — ONDANSETRON 4 MG/1
4 TABLET, FILM COATED ORAL EVERY 8 HOURS PRN
Qty: 12 TABLET | Refills: 1 | Status: SHIPPED | OUTPATIENT
Start: 2023-06-23

## 2023-06-23 NOTE — TELEPHONE ENCOUNTER
Patient mother called today saying she is sick to her stomach after coming home from the hospital  she was in for gall bladder surgery on 6/20/23      I spoke to Dr Isis Aguirre and was told she needs to call her surgeon    patient understood

## 2023-06-25 LAB
BACTERIA BLD CULT: NORMAL
BACTERIA BLD CULT: NORMAL

## 2023-06-27 PROCEDURE — 88304 TISSUE EXAM BY PATHOLOGIST: CPT | Performed by: PATHOLOGY

## 2023-07-03 ENCOUNTER — OFFICE VISIT (OUTPATIENT)
Dept: SURGERY | Facility: CLINIC | Age: 18
End: 2023-07-03

## 2023-07-03 DIAGNOSIS — K80.00 ACUTE CHOLECYSTITIS DUE TO BILIARY CALCULUS: Primary | ICD-10-CM

## 2023-07-03 PROCEDURE — 99024 POSTOP FOLLOW-UP VISIT: CPT | Performed by: SURGERY

## 2023-07-03 NOTE — PROGRESS NOTES
Assessment/Plan: Patient is status post laparoscopic cholecystectomy for acute calculus cholecystitis. She returns today for follow-up visit. Overall she feels well. She has no complaints. Her incisions are clean and healing well. All questions answered. There are no diagnoses linked to this encounter. Pathology: Reviewed with patient, all questions answered. Postoperative restrictions reviewed. All questions answered. ______________________________________________________  HPI: Patient presents post operatively. Laparoscopic cholecystectomy 6/21/2023   Final Diagnosis  A.  Gallbladder, cholecystectomy:     - Acute and chronic cholecystitis with cholelithiasis and cholesterolosis. - Reactive periductal lymph node. ROS:  General ROS: negative for - chills, fatigue, fever or night sweats, weight loss  Respiratory ROS: no cough, shortness of breath, or wheezing  Cardiovascular ROS: no chest pain or dyspnea on exertion  Genito-Urinary ROS: no dysuria, trouble voiding, or hematuria  Musculoskeletal ROS: negative for - gait disturbance, joint pain or muscle pain  Neurological ROS: no TIA or stroke symptoms  GI ROS: see HPI  Skin ROS: no new rashes or lesions   Lymphatic ROS: no new adenopathy noted by pt.    GYN ROS: see HPI, no new GYN history or bleeding noted  Psy ROS: no new mental or behavioral disturbances         Patient Active Problem List   Diagnosis   • Menorrhagia with regular cycle   • Generalized anxiety disorder   • Sleep disturbance   • Panic attacks   • Low ferritin level   • Acute cholecystitis due to biliary calculus   • Mild intermittent asthma       Allergies:  Penicillins      Current Outpatient Medications:   •  albuterol (PROVENTIL HFA,VENTOLIN HFA) 90 mcg/act inhaler, Inhale 2 puffs every 6 (six) hours as needed for wheezing, Disp: 8 g, Rfl: 0  •  ALPRAZolam (XANAX) 0.25 mg tablet, Take 1 tablet (0.25 mg total) by mouth daily at bedtime as needed for anxiety, Disp: 5 tablet, Rfl: 0  •  escitalopram (Lexapro) 20 mg tablet, Take 1 tablet (20 mg total) by mouth daily, Disp: 30 tablet, Rfl: 5  •  HYDROcodone-acetaminophen (Norco) 5-325 mg per tablet, Take 1 tablet by mouth every 6 (six) hours as needed for pain Max Daily Amount: 4 tablets, Disp: 10 tablet, Rfl: 0  •  ondansetron (ZOFRAN) 4 mg tablet, Take 1 tablet (4 mg total) by mouth every 8 (eight) hours as needed for nausea or vomiting, Disp: 12 tablet, Rfl: 1  •  traZODone (DESYREL) 50 mg tablet, Take 1 tablet (50 mg total) by mouth daily at bedtime as needed for sleep, Disp: 30 tablet, Rfl: 5  •  Xulane 150-35 MCG/24HR, APPLY 1 PATCH ONE TIME PER WEEK, Disp: 9 patch, Rfl: 1    Past Medical History:   Diagnosis Date   • Anemia    • Asthma    • Cancer ([facility])    • Coronary artery disease    • Diabetes mellitus ([address])    • Hypertension    • Myocardial infarction [facility])    • Urinary tract infection        Past Surgical History:   Procedure Laterality Date   • CHOLECYSTECTOMY  06-21-23   • CHOLECYSTECTOMY LAPAROSCOPIC N/A 06/21/2023    Procedure: CHOLECYSTECTOMY LAPAROSCOPIC: POSSIBLE OPEN;  Surgeon: Nito Suarez MD;  Location: [facility];  Service: General   • CORONARY ARTERY BYPASS GRAFT     • DENTAL SURGERY     • NO PAST SURGERIES         Family History   Problem Relation Age of Onset   • Heart disease Father    • Hypertension Father    • Diabetes Father    • Coronary artery disease Father    • Hyperlipidemia Father    • Ovarian cancer Maternal Grandmother    • Diabetes Maternal Grandmother    • Cancer Maternal Grandmother    • Arthritis Mother    • No Known Problems Sister         reports that she has been smoking. She has never been exposed to tobacco smoke. She has never used smokeless tobacco. She reports that she does not drink alcohol and does not use drugs.     PHYSICAL EXAM    LMP 06/01/2023 (Approximate)     General: normal, cooperative, no distress  Abdominal: soft, nondistended or nontender  Incision: clean, dry, and intact and healing well      Trish Santana MD    Date: 7/3/2023 Time: 2:40 PM

## 2023-07-25 DIAGNOSIS — F41.1 GENERALIZED ANXIETY DISORDER: ICD-10-CM

## 2023-07-25 RX ORDER — ESCITALOPRAM OXALATE 20 MG/1
20 TABLET ORAL DAILY
Qty: 90 TABLET | Refills: 2 | OUTPATIENT
Start: 2023-07-25

## 2023-07-27 DIAGNOSIS — N94.6 DYSMENORRHEA IN ADOLESCENT: ICD-10-CM

## 2023-07-27 DIAGNOSIS — N92.0 MENORRHAGIA WITH REGULAR CYCLE: ICD-10-CM

## 2023-07-28 RX ORDER — NORELGESTROMIN AND ETHINYL ESTRADIOL 150; 35 UG/D; UG/D
PATCH TRANSDERMAL
Qty: 9 PATCH | Refills: 1 | Status: SHIPPED | OUTPATIENT
Start: 2023-07-28

## 2023-08-16 ENCOUNTER — OFFICE VISIT (OUTPATIENT)
Dept: URGENT CARE | Facility: MEDICAL CENTER | Age: 18
End: 2023-08-16
Payer: COMMERCIAL

## 2023-08-16 VITALS
RESPIRATION RATE: 18 BRPM | OXYGEN SATURATION: 98 % | HEIGHT: 63 IN | DIASTOLIC BLOOD PRESSURE: 71 MMHG | TEMPERATURE: 98.1 F | BODY MASS INDEX: 28.35 KG/M2 | HEART RATE: 80 BPM | WEIGHT: 160 LBS | SYSTOLIC BLOOD PRESSURE: 128 MMHG

## 2023-08-16 DIAGNOSIS — R35.0 URINARY FREQUENCY: ICD-10-CM

## 2023-08-16 DIAGNOSIS — R51.9 ACUTE NONINTRACTABLE HEADACHE, UNSPECIFIED HEADACHE TYPE: ICD-10-CM

## 2023-08-16 DIAGNOSIS — R30.0 DYSURIA: Primary | ICD-10-CM

## 2023-08-16 LAB
SL AMB  POCT GLUCOSE, UA: NORMAL
SL AMB LEUKOCYTE ESTERASE,UA: NORMAL
SL AMB POCT BILIRUBIN,UA: NORMAL
SL AMB POCT BLOOD,UA: NORMAL
SL AMB POCT CLARITY,UA: CLEAR
SL AMB POCT COLOR,UA: YELLOW
SL AMB POCT KETONES,UA: NORMAL
SL AMB POCT NITRITE,UA: NORMAL
SL AMB POCT PH,UA: 7
SL AMB POCT SPECIFIC GRAVITY,UA: 1
SL AMB POCT URINE PROTEIN: NORMAL
SL AMB POCT UROBILINOGEN: 0.2

## 2023-08-16 PROCEDURE — 87086 URINE CULTURE/COLONY COUNT: CPT | Performed by: PHYSICIAN ASSISTANT

## 2023-08-16 PROCEDURE — 81002 URINALYSIS NONAUTO W/O SCOPE: CPT | Performed by: PHYSICIAN ASSISTANT

## 2023-08-16 PROCEDURE — 99213 OFFICE O/P EST LOW 20 MIN: CPT | Performed by: PHYSICIAN ASSISTANT

## 2023-08-16 RX ORDER — SULFAMETHOXAZOLE AND TRIMETHOPRIM 800; 160 MG/1; MG/1
1 TABLET ORAL EVERY 12 HOURS SCHEDULED
Qty: 6 TABLET | Refills: 0 | Status: SHIPPED | OUTPATIENT
Start: 2023-08-16 | End: 2023-08-19

## 2023-08-16 NOTE — PROGRESS NOTES
[facility]        NAME: Grace Rodas is a 19 y.o. female  : 2005    MRN: [id number]  DATE: 2023  TIME: 6:13 PM    Assessment and Plan   Dysuria [R30.0]  1. Dysuria  POCT urine dip    Urine culture    sulfamethoxazole-trimethoprim (BACTRIM DS) 800-160 mg per tablet      2. Urinary frequency  sulfamethoxazole-trimethoprim (BACTRIM DS) 800-160 mg per tablet      3. Acute nonintractable headache, unspecified headache type              Patient Instructions     1. Increase oral fluid consumption. 2. Over-the-counter ibuprofen and/or acetaminophen as needed for pain. 3. Return here or go to the ER for any worsening symptoms. 4. Follow-up with primary care for any symptoms lasting longer than 5 days. Chief Complaint     Chief Complaint   Patient presents with   • Possible UTI     Pt. C/O urinary frequency, burning with urination, and headache that began last night. History of Present Illness       24-year-old female patient with a 24-hour history of dysuria, urinary frequency, urinary urgency, headache, fatigue. Patient denies any fever or chills. Denies abdominal pain or back pain. No nausea or vomiting. Patient states she has had a UTI in the past that felt similar. Review of Systems   Review of Systems   Constitutional: Negative for chills and fever. HENT: Negative for ear pain and sore throat. Eyes: Negative for pain and visual disturbance. Respiratory: Negative for cough and shortness of breath. Cardiovascular: Negative for chest pain and palpitations. Gastrointestinal: Negative for abdominal pain and vomiting. Genitourinary: Positive for dysuria and frequency. Negative for hematuria. Musculoskeletal: Negative for arthralgias and back pain. Skin: Negative for color change and rash. Neurological: Positive for headaches. Negative for seizures and syncope. All other systems reviewed and are negative.         Current Medications       Current Outpatient Medications:   •  albuterol (PROVENTIL HFA,VENTOLIN HFA) 90 mcg/act inhaler, Inhale 2 puffs every 6 (six) hours as needed for wheezing, Disp: 8 g, Rfl: 0  •  ALPRAZolam (XANAX) 0.25 mg tablet, Take 1 tablet (0.25 mg total) by mouth daily at bedtime as needed for anxiety, Disp: 5 tablet, Rfl: 0  •  escitalopram (Lexapro) 20 mg tablet, Take 1 tablet (20 mg total) by mouth daily, Disp: 30 tablet, Rfl: 5  •  sulfamethoxazole-trimethoprim (BACTRIM DS) 800-160 mg per tablet, Take 1 tablet by mouth every 12 (twelve) hours for 3 days, Disp: 6 tablet, Rfl: 0  •  Xulane 150-35 MCG/24HR, APPLY 1 PATCH ONE TIME PER WEEK, Disp: 9 patch, Rfl: 1  •  HYDROcodone-acetaminophen (Norco) 5-325 mg per tablet, Take 1 tablet by mouth every 6 (six) hours as needed for pain Max Daily Amount: 4 tablets (Patient not taking: Reported on 8/16/2023), Disp: 10 tablet, Rfl: 0  •  ondansetron (ZOFRAN) 4 mg tablet, Take 1 tablet (4 mg total) by mouth every 8 (eight) hours as needed for nausea or vomiting (Patient not taking: Reported on 8/16/2023), Disp: 12 tablet, Rfl: 1  •  traZODone (DESYREL) 50 mg tablet, Take 1 tablet (50 mg total) by mouth daily at bedtime as needed for sleep, Disp: 30 tablet, Rfl: 5    Current Allergies     Allergies as of 08/16/2023 - Reviewed 08/16/2023   Allergen Reaction Noted   • Penicillins Rash 03/06/2016            The following portions of the patient's history were reviewed and updated as appropriate: allergies, current medications, past family history, past medical history, past social history, past surgical history and problem list.     Past Medical History:   Diagnosis Date   • Anemia    • Asthma    • Cancer ([address] )    • Coronary artery disease    • Diabetes mellitus ([address])    • Hypertension    • Myocardial infarction [facility])    • Urinary tract infection        Past Surgical History:   Procedure Laterality Date   • CHOLECYSTECTOMY  06-21-23   • CHOLECYSTECTOMY LAPAROSCOPIC N/A 06/21/2023    Procedure: CHOLECYSTECTOMY LAPAROSCOPIC: POSSIBLE OPEN;  Surgeon: Orlin Doyle MD;  Location: [facility];  Service: General   • CORONARY ARTERY BYPASS GRAFT     • DENTAL SURGERY     • NO PAST SURGERIES         Family History   Problem Relation Age of Onset   • Heart disease Father    • Hypertension Father    • Diabetes Father    • Coronary artery disease Father    • Hyperlipidemia Father    • Ovarian cancer Maternal Grandmother    • Diabetes Maternal Grandmother    • Cancer Maternal Grandmother    • Arthritis Mother    • No Known Problems Sister          Medications have been verified. Objective   /71   Pulse 80   Temp 98.1 °F (36.7 °C)   Resp 18   Ht 5' 2.5" (1.588 m)   Wt 72.6 kg (160 lb)   SpO2 98%   BMI 28.80 kg/m²        Physical Exam     Physical Exam  Vitals and nursing note reviewed. Constitutional:       Appearance: Normal appearance. HENT:      Head: Normocephalic. Nose: Nose normal.   Eyes:      Conjunctiva/sclera: Conjunctivae normal.      Pupils: Pupils are equal, round, and reactive to light. Cardiovascular:      Rate and Rhythm: Normal rate. Pulmonary:      Effort: Pulmonary effort is normal.      Breath sounds: Normal breath sounds. Abdominal:      General: Abdomen is flat. Palpations: Abdomen is soft. Tenderness: There is no abdominal tenderness. There is no right CVA tenderness or left CVA tenderness. Musculoskeletal:      Cervical back: Normal range of motion. Skin:     General: Skin is warm and dry. Capillary Refill: Capillary refill takes less than 2 seconds. Neurological:      General: No focal deficit present. Mental Status: She is alert and oriented to person, place, and time. Cranial Nerves: No cranial nerve deficit. Sensory: No sensory deficit. Motor: No weakness.       Coordination: Coordination normal.      Gait: Gait normal.      Deep Tendon Reflexes: Reflexes normal.   Psychiatric:         Mood and Affect: Mood normal.         Behavior: Behavior normal.           Medical decision making note:  Despite negative UA, will treat for UTI based on symptoms pending the culture results. I believe that the headache is associated with the UTI and will have her treat with Tylenol or Motrin. Patient knows the importance of follow-up if her symptoms do not improve.

## 2023-08-16 NOTE — PATIENT INSTRUCTIONS
1. Increase oral fluid consumption. 2. Over-the-counter ibuprofen and/or acetaminophen as needed for pain. 3. Return here or go to the ER for any worsening symptoms. 4. Follow-up with primary care for any symptoms lasting longer than 5 days.

## 2023-08-17 LAB — BACTERIA UR CULT: NORMAL

## 2023-08-23 DIAGNOSIS — F41.1 GENERALIZED ANXIETY DISORDER: ICD-10-CM

## 2023-08-23 RX ORDER — ESCITALOPRAM OXALATE 20 MG/1
20 TABLET ORAL DAILY
Qty: 90 TABLET | Refills: 2 | Status: SHIPPED | OUTPATIENT
Start: 2023-08-23

## 2023-09-07 ENCOUNTER — OFFICE VISIT (OUTPATIENT)
Dept: FAMILY MEDICINE CLINIC | Facility: CLINIC | Age: 18
End: 2023-09-07
Payer: COMMERCIAL

## 2023-09-07 VITALS
WEIGHT: 157 LBS | SYSTOLIC BLOOD PRESSURE: 122 MMHG | HEIGHT: 63 IN | DIASTOLIC BLOOD PRESSURE: 70 MMHG | OXYGEN SATURATION: 99 % | HEART RATE: 83 BPM | TEMPERATURE: 97.3 F | BODY MASS INDEX: 27.82 KG/M2

## 2023-09-07 DIAGNOSIS — F41.1 GENERALIZED ANXIETY DISORDER: ICD-10-CM

## 2023-09-07 DIAGNOSIS — H81.10 BPPV (BENIGN PAROXYSMAL POSITIONAL VERTIGO), UNSPECIFIED LATERALITY: Primary | ICD-10-CM

## 2023-09-07 PROCEDURE — 99214 OFFICE O/P EST MOD 30 MIN: CPT | Performed by: FAMILY MEDICINE

## 2023-09-07 RX ORDER — ALPRAZOLAM 0.5 MG/1
0.5 TABLET ORAL
Qty: 10 TABLET | Refills: 0 | Status: SHIPPED | OUTPATIENT
Start: 2023-09-07 | End: 2023-09-17

## 2023-09-07 RX ORDER — MECLIZINE HCL 12.5 MG/1
12.5 TABLET ORAL 3 TIMES DAILY PRN
Qty: 30 TABLET | Refills: 0 | Status: SHIPPED | OUTPATIENT
Start: 2023-09-07

## 2023-09-07 NOTE — PROGRESS NOTES
Name: Mahogany Mendoza      : 2005      MRN: [id number]  Encounter Provider: Patti Vail MD  Encounter Date: 2023   Encounter department: [address] [place] [address]     1. BPPV (benign paroxysmal positional vertigo), unspecified laterality  -     meclizine (ANTIVERT) 12.5 MG tablet; Take 1 tablet (12.5 mg total) by mouth 3 (three) times a day as needed for dizziness  -     Ambulatory Referral to Physical Therapy; Future    2. Generalized anxiety disorder  -     ALPRAZolam (XANAX) 0.5 mg tablet; Take 1 tablet (0.5 mg total) by mouth daily at bedtime as needed for anxiety for up to 10 days    Positive Khalida-Hallpike in office. Patient had symptoms with examination. Refer to physical therapy for treatment. Can take meclizine as needed for the dizziness. Patient had no response to Xanax 0.25 mg prior to dental visit. Also notes during gallbladder surgery she was given 5 mg of diazepam and had no response. Patient could be a fast metabolizer. We will increase Xanax to 0.5 mg. If no response can try 1 mg prior to dental visits. I have spent a total time of 30 minutes on 23 in caring for this patient including Diagnostic results, Prognosis, Risks and benefits of tx options, Instructions for management, Patient and family education, Importance of tx compliance, Risk factor reductions, Impressions, Counseling / Coordination of care, Documenting in the medical record, Reviewing / ordering tests, medicine, procedures   and Obtaining or reviewing history  . Subjective      Patient presents with:  Dizziness: Patients states its been a couple weeks   Only happens when she lays downs     Only happens with positional changes. Feels like the room is spinning. Feels like she is speaking in a tunnel. Dizziness resolves when she gets up. Never experienced this before. Review of Systems   Constitutional: Negative for fatigue and fever.    HENT: Negative for hearing loss and sore throat. Eyes: Negative for visual disturbance. Respiratory: Negative for cough, chest tightness and shortness of breath. Cardiovascular: Negative for chest pain, palpitations and leg swelling. Gastrointestinal: Positive for nausea. Negative for abdominal pain, constipation, diarrhea and vomiting. Endocrine: Negative for cold intolerance and heat intolerance. Genitourinary: Negative for flank pain. Musculoskeletal: Negative for back pain and neck pain. Skin: Negative for rash. Neurological: Positive for dizziness (With positional changes. ). Negative for headaches. Psychiatric/Behavioral: Negative for behavioral problems and confusion.        Current Outpatient Medications on File Prior to Visit   Medication Sig   • albuterol (PROVENTIL HFA,VENTOLIN HFA) 90 mcg/act inhaler Inhale 2 puffs every 6 (six) hours as needed for wheezing   • escitalopram (LEXAPRO) 20 mg tablet TAKE 1 TABLET BY MOUTH EVERY DAY   • traZODone (DESYREL) 50 mg tablet Take 1 tablet (50 mg total) by mouth daily at bedtime as needed for sleep   • Xulane 150-35 MCG/24HR APPLY 1 PATCH ONE TIME PER WEEK   • ondansetron (ZOFRAN) 4 mg tablet Take 1 tablet (4 mg total) by mouth every 8 (eight) hours as needed for nausea or vomiting (Patient not taking: Reported on 8/16/2023)   • [DISCONTINUED] ALPRAZolam (XANAX) 0.25 mg tablet Take 1 tablet (0.25 mg total) by mouth daily at bedtime as needed for anxiety (Patient not taking: Reported on 9/7/2023)   • [DISCONTINUED] HYDROcodone-acetaminophen (Norco) 5-325 mg per tablet Take 1 tablet by mouth every 6 (six) hours as needed for pain Max Daily Amount: 4 tablets (Patient not taking: Reported on 8/16/2023)       Objective     /70 (BP Location: Left arm, Patient Position: Sitting, Cuff Size: Large)   Pulse 83   Temp (!) 97.3 °F (36.3 °C)   Ht 5' 2.5" (1.588 m)   Wt 71.2 kg (157 lb)   SpO2 99%   BMI 28.26 kg/m²     Physical Exam  Vitals and nursing note reviewed. Constitutional:       Appearance: She is well-developed. HENT:      Head: Normocephalic and atraumatic. Cardiovascular:      Rate and Rhythm: Normal rate and regular rhythm. Pulmonary:      Effort: Pulmonary effort is normal.      Breath sounds: Normal breath sounds. Neurological:      Mental Status: She is alert.       Comments: Positive debi hallpike    Psychiatric:         Mood and Affect: Mood normal.         Behavior: Behavior normal.       Manda Hilton MD

## 2023-09-07 NOTE — PATIENT INSTRUCTIONS
Benign Paroxysmal Positional Vertigo   AMBULATORY CARE:   Benign paroxysmal positional vertigo (BPPV)  is an inner ear condition that causes you to suddenly feel dizzy. Benign means it is not serious or life-threatening. BPPV is caused by a problem with the nerves and structure of your inner ear. BPPV happens when small pieces of calcium break loose and lump together in one of your inner ear canals. Common symptoms include the following: You may feel that you or the room is moving or spinning. Turning your head, rolling over in bed, getting up or lying down may lead to sudden vertigo. You may also have any of the following symptoms:  Nystagmus (quick shaky eye movement that you cannot control)    Nausea    Poor balance and feeling unsteady when you walk    Seek care immediately if:   You fall during a BPPV episode and are injured. You have a severe headache that does not go away. You have new changes in your vision or feel weak or confused. You have problems hearing, or you have ringing or buzzing in your ears. Contact your healthcare provider if:   Your BPPV symptoms do not go away or they return. You have problems with your balance, or you are falling often. You have new or increased nausea or vomiting with vertigo. You feel anxious or depressed and do not want to leave your home. You have questions or concerns about your condition or care. Management of BPPV:   Your healthcare provider will teach you how to move your head and body to prevent symptoms. For example, he or she may teach you certain ways to move your head or body. These movements usually help relieve your symptoms and keep the dizziness from returning. The exercises help move the calcium pieces to a different part of your ear. Do the movements only as directed. Vestibular and balance rehabilitation therapy (VBRT)  is used to teach you exercises to improve your balance and strength.  VBRT may help decrease your dizziness and prevent injuries if you are at risk for falls. Medicines  may be recommended or prescribed to treat dizziness or nausea. Prevent your symptoms:   Try to avoid sudden head movements. Stand up and lie down slowly. Raise and support your head when you lie down. Place pillows under your upper back and head or rest in a recliner. Change your position often when you are lying down. Try not to lie with your head on the same side for long periods of time. Roll over slowly. Wear protective gear  when you ride a bike or play sports. A helmet helps protect your head from injury. Follow up with your healthcare provider as directed: You may need to return in 1 month to check the progress of your treatment. Write down your questions so you remember to ask them during your visits. © Copyright Cynthia Chacon 2022 Information is for End User's use only and may not be sold, redistributed or otherwise used for commercial purposes. The above information is an  only. It is not intended as medical advice for individual conditions or treatments. Talk to your doctor, nurse or pharmacist before following any medical regimen to see if it is safe and effective for you.

## 2023-09-11 ENCOUNTER — EVALUATION (OUTPATIENT)
Dept: PHYSICAL THERAPY | Facility: MEDICAL CENTER | Age: 18
End: 2023-09-11
Payer: COMMERCIAL

## 2023-09-11 DIAGNOSIS — H81.10 BPPV (BENIGN PAROXYSMAL POSITIONAL VERTIGO), UNSPECIFIED LATERALITY: Primary | ICD-10-CM

## 2023-09-11 PROCEDURE — 97162 PT EVAL MOD COMPLEX 30 MIN: CPT

## 2023-09-11 NOTE — PROGRESS NOTES
PT Evaluation         Today's date: 2023  Patient name: Mick Garcia  : 2005  MRN: [id number]  Referring provider: Elvie Hopkins  Dx:   Encounter Diagnosis     ICD-10-CM    1. BPPV (benign paroxysmal positional vertigo), unspecified laterality  H81.10 Ambulatory Referral to Physical Therapy            Assessment  Assessment details: Patient is a 19 y.o. Female who presents to skilled outpatient PT with Vertigo. Cervical spine integrity intact per normal and negative results of mVBI, Sharp Ulises, and Alar Stability Tests respectively. Patient displayed L upward torisonal nystagmus with positional assessment indicating likely L Posterior Canalithiasis. Trialed L Epley Maneuver today with Excellent results and eventual resolution of symptoms by final repetition. Educated the patient on the anatomy of the inner ear, potential for residual dizziness for up to 48 hours following session, and to seek higher level of care if symptoms worsen or change and She was in good verbal understanding. She will benefit from skilled outpatient PT in order to reduce dizziness symptoms and return to PLOF. Patient verbalized understanding of POC. Please contact me if you have any questions or recommendations. Thank you for the referral and the opportunity to share in [facility].       Cut off score   All date taken from APTA Neuro Section or Rehab Measures    DGI:  MDC for Vestibular Disorders: 4 points  [facility] Older Adults: 3 Points  Falls risk cut off: <19/24    FGA:  MCID: 4 points  Geriatrics/Community Dwelling Older Adults: </= 22/30 fall risk  Geriatrics/Community Dwelling Older Adults: </= 20/30 unexplained falls in the next 6 months  Parkinsons: </= 18/30 fall risk    mCTSIB (normed on ages 24-63, lower number is less sway or better static balance)  Eyes open firm surface (norm 0. 21-0.48)  Eyes closed firm surface (norm 0.48-0.99)  Eyes open foam surface (norm 0.38-0.71)  Eyes closed foam surface (norm 0.70-2.22)    DHI:  0-39: low perception of handicap  40-69: moderate perception of handicap  : severe perception of handicap  > 60: increased risk for falls        Impairments: activity intolerance, impaired balance, lacks appropriate, HEP, safety issue  Understanding of Dx/Px/POC: Excellent  Prognosis: Excellent      Goals    BPPV Goals (4 weeks):  - Patient will report complete resolution of symptoms in order to promote return to PLOF  - Patient will complete FGA in order to promote return to safe performance of ADLs  - Patient will demonstrate (-) Khalida-Hallpike test on L side  - Patient will demonstrate (-) Roll Test on L side        Plan  Plan details: CRT, VRT  Patient would benefit from: PT Eval  Planned therapy interventions: balance, HEP, manual therapy, neuromuscular re-education, patient education  Frequency: 1-3x per week  Duration in weeks: 12  Plan of Care beginning date: 9/11/2023  Plan of Care expiration date: 3 months - 12/11/2023  Treatment plan discussed with: Patient      Subjective Evaluation    History of Present Illness  - Mechanism of injury: Patient is a 19year old female reporting to skilled PT with reports of dizziness. Summary of PCP appointment:     "  Assessment & Plan      1. BPPV (benign paroxysmal positional vertigo), unspecified laterality  -     meclizine (ANTIVERT) 12.5 MG tablet; Take 1 tablet (12.5 mg total) by mouth 3 (three) times a day as needed for dizziness  -     Ambulatory Referral to Physical Therapy; Future     2. Generalized anxiety disorder  -     ALPRAZolam (XANAX) 0.5 mg tablet; Take 1 tablet (0.5 mg total) by mouth daily at bedtime as needed for anxiety for up to 10 days     Positive Taylor-Hallpike in office. Patient had symptoms with examination. Refer to physical therapy for treatment.   Can take meclizine as needed for the dizziness. Patient had no response to Xanax 0.25 mg prior to dental visit. Also notes during gallbladder surgery she was given 5 mg of diazepam and had no response. Patient could be a fast metabolizer. We will increase Xanax to 0.5 mg.   If no response can try 1 mg prior to dental visits."      Dizziness Subjective  - How long does dizziness last: 60 seconds  - How would you describe the dizziness: Vertigo, room spinning  - Rolling in bed: Yes  - Supine to/from sit: Yes  - Recent hearing loss: No  - Tinnitus: No  - Aural fullness/ear pain: No  - Vision changes: No  - History of recent viral infections: No  - History of migraines: No    Red Flag Screen  - Numbness: No  - Tingling: No  - Weakness: No  - Unilateral hearing loss: No  - Slurred speech: No  - Progressive hearing loss: No  - Tremors: No  - Poor coordination: No  - UMN signs: No  - LoC: No  - Rigidity: No  - Visual field loss: No  - Memory loss: No  - CN dysfunction: No  - Vertical nystagmus: No    Pain  Current pain ratin/10  At best pain ratin/10  At worst pain ratin/10  Location: No pain reported  Aggravating factors: None    Social Support  Lives in: Multi-level home  Lives with: Boyfriend    Employment status: Full time [employer]  Hand dominance: R    Treatments  Previous treatment: None  Current treatment: PT Eval  Diagnostic Testing: See PMH      Objective      BPPV Objective  Integrity Testing  - mVBI: WNL bilaterally   - Sharp Ulises: WNL bilaterally  - Alar Ligament Stability Test: WNL bilaterally  - Posture: Mild forward head, rounded shoulders  - Palpation: Mild cervical Tonicity      Positional Testing  - R Orosi-Hallpike: Negative 2x loaded debi hallpike   - L Orosi-Hallpike: Postive 2x loaded debi hallpike  - R Roll Test: Negative 2x  - L Roll Test: Negative 2x        Epley trial- 2x to the L     Outcome Measures Initial Eval  2023             mCTSIB  - FTEO (firm)  - FTEC (firm)  - FTEO (foam)  - FTEC (foam)    Defer           DGI Defer             FGA Defer             10 meter Defer             DHI NV/100                                                                                                    Precautions: Dizziness  Past Medical History:   Diagnosis Date   • Anemia    • Asthma    • Cancer ([address])    • Coronary artery disease    • Diabetes mellitus ([address])    • Hypertension    • Myocardial infarction ([address])    • Urinary tract infection

## 2023-09-14 ENCOUNTER — APPOINTMENT (OUTPATIENT)
Dept: PHYSICAL THERAPY | Facility: MEDICAL CENTER | Age: 18
End: 2023-09-14
Payer: COMMERCIAL

## 2023-09-25 NOTE — PROGRESS NOTES
Assessment/Plan   Problem List Items Addressed This Visit    None  Visit Diagnoses     Well woman exam    -  Primary          Discussion  I have discussed the importance of monthly self-breast exams, exercise and healthy diet. Encourage safe sexual practices; STI testing - declines   Contraception - patch. She desires switching to IUD for [facility]. Reviewed Paragard vs hormonal IUDs including procedure, risks, benefits, and side effects. She desires Deetta Coombe IUD. Clinical team messaged. The current ASCCP guidelines were reviewed. Patient does not require pap due to age. All questions have been answered to her satisfaction  RTO for APE or sooner if needed    Subjective     HPI   Martha Johnson is a 19 y.o. female who presents for annual well woman exam.      LMP -09/22/23 ; Periods are reg q 28 days and last 5 days; No excessive bleeding; No intermenstrual bleeding or spotting; Cramps are tolerable. No vulvar itch/burn; No vaginal itch/burn; No abn discharge or odor; No urinary sx - burning/pain/frequency/hematuria    (+) SBEs - no breast masses, asymmetry, nipple discharge or bleeding, changes in skin of breast, or breast tenderness bilaterally    No abd/pelvic pain or HAs;     Pt is sexually active in a mutually monog sexual relationship; No issues with intercourse; She declines sti/hiv/hep testing; Feels safe at home. Current contraception: patch    (+) PCP for routine Bw/care;    Review of Systems   Constitutional: Negative for fatigue. Eyes: Negative for photophobia and visual disturbance. Respiratory: Negative for cough and shortness of breath. Cardiovascular: Negative for chest pain and palpitations. Gastrointestinal: Negative for abdominal pain, blood in stool, constipation, diarrhea, nausea and rectal pain. Genitourinary: Negative for dyspareunia, dysuria, flank pain, frequency, genital sores, menstrual problem, pelvic pain, urgency, vaginal bleeding, vaginal discharge and vaginal pain. Musculoskeletal: Negative for arthralgias and back pain. Skin: Negative for rash. Neurological: Negative for weakness and headaches.        The following portions of the patient's history were reviewed and updated as appropriate: allergies, current medications, past family history, past medical history, past social history, past surgical history and problem list.         OB History        0    Para   0    Term   0       0    AB   0    Living   0       SAB   0    IAB   0    Ectopic   0    Multiple   0    Live Births   0                 Past Medical History:   Diagnosis Date   • Anemia    • Asthma    • Cancer ([address] [facility])    • Coronary artery disease    • Depression    • Diabetes mellitus ([facility])    • Hypertension    • Kidney stone    • Myocardial infarction [facility])    • Urinary tract infection        Past Surgical History:   Procedure Laterality Date   • CHOLECYSTECTOMY  23   • CHOLECYSTECTOMY LAPAROSCOPIC N/A 2023    Procedure: CHOLECYSTECTOMY LAPAROSCOPIC: POSSIBLE OPEN;  Surgeon: Temitope Huerta MD;  Location: [facility];  Service: General   • CORONARY ARTERY BYPASS GRAFT     • DENTAL SURGERY     • NO PAST SURGERIES         Family History   Problem Relation Age of Onset   • Heart disease Father    • Hypertension Father    • Diabetes Father    • Coronary artery disease Father    • Hyperlipidemia Father    • Deep vein thrombosis Father    • Heart attack Father    • Thyroid disease Father    • Ovarian cancer Maternal Grandmother    • Diabetes Maternal Grandmother    • Cancer Maternal Grandmother    • Arthritis Mother    • No Known Problems Sister        Social History     Socioeconomic History   • Marital status: Single     Spouse name: Not on file   • Number of children: Not on file   • Years of education: Not on file   • Highest education level: Not on file   Occupational History   • Not on file   Tobacco Use   • Smoking status: Never     Passive exposure: Never   • Smokeless tobacco: Never Vaping Use   • Vaping Use: Every day   • Substances: Nicotine   Substance and Sexual Activity   • Alcohol use: No   • Drug use: No   • Sexual activity: Yes     Partners: Male     Birth control/protection: Patch     Comment: declines STD /HIV testing    Other Topics Concern   • Not on file   Social History Narrative   • Not on file     Social Determinants of Health     Financial Resource Strain: Not on file   Food Insecurity: Not on file   Transportation Needs: Not on file   Physical Activity: Not on file   Stress: Not on file   Social Connections: Not on file   Intimate Partner Violence: Not on file   Housing Stability: Not on file         Current Outpatient Medications:   •  albuterol (PROVENTIL HFA,VENTOLIN HFA) 90 mcg/act inhaler, Inhale 2 puffs every 6 (six) hours as needed for wheezing, Disp: 8 g, Rfl: 0  •  ALPRAZolam (XANAX) 0.5 mg tablet, Take 1 tablet (0.5 mg total) by mouth daily at bedtime as needed for anxiety for up to 10 days, Disp: 10 tablet, Rfl: 0  •  escitalopram (LEXAPRO) 20 mg tablet, TAKE 1 TABLET BY MOUTH EVERY DAY, Disp: 90 tablet, Rfl: 2  •  ondansetron (ZOFRAN) 4 mg tablet, Take 1 tablet (4 mg total) by mouth every 8 (eight) hours as needed for nausea or vomiting, Disp: 12 tablet, Rfl: 1  •  traZODone (DESYREL) 50 mg tablet, Take 1 tablet (50 mg total) by mouth daily at bedtime as needed for sleep, Disp: 30 tablet, Rfl: 5  •  Xulane 150-35 MCG/24HR, APPLY 1 PATCH ONE TIME PER WEEK, Disp: 9 patch, Rfl: 1  •  meclizine (ANTIVERT) 12.5 MG tablet, Take 1 tablet (12.5 mg total) by mouth 3 (three) times a day as needed for dizziness (Patient not taking: Reported on 9/26/2023), Disp: 30 tablet, Rfl: 0    Allergies   Allergen Reactions   • Penicillins Rash       Objective   Vitals:    09/26/23 1558   BP: 116/72   BP Location: Left arm   Patient Position: Sitting   Cuff Size: Standard   Weight: 71.9 kg (158 lb 9.6 oz)   Height: 5' 2.5" (1.588 m)     Physical Exam  Vitals and nursing note reviewed. Constitutional:       General: She is not in acute distress. Appearance: Normal appearance. HENT:      Head: Normocephalic. Eyes:      Conjunctiva/sclera: Conjunctivae normal.   Cardiovascular:      Rate and Rhythm: Normal rate and regular rhythm. Heart sounds: Normal heart sounds. Pulmonary:      Effort: Pulmonary effort is normal. No respiratory distress. Breath sounds: Normal breath sounds. Abdominal:      General: Abdomen is flat. Palpations: Abdomen is soft. Tenderness: There is no right CVA tenderness or left CVA tenderness. Musculoskeletal:         General: Normal range of motion. Cervical back: Normal range of motion. Right lower leg: No edema. Left lower leg: No edema. Lymphadenopathy:      Cervical: No cervical adenopathy. Skin:     General: Skin is warm and dry. Neurological:      Mental Status: She is alert and oriented to person, place, and time. Psychiatric:         Mood and Affect: Mood normal.         Behavior: Behavior normal.         Thought Content: Thought content normal.         Judgment: Judgment normal.         There are no Patient Instructions on file for this visit.

## 2023-09-26 ENCOUNTER — ANNUAL EXAM (OUTPATIENT)
Dept: OBGYN CLINIC | Facility: CLINIC | Age: 18
End: 2023-09-26
Payer: COMMERCIAL

## 2023-09-26 VITALS
WEIGHT: 158.6 LBS | SYSTOLIC BLOOD PRESSURE: 116 MMHG | HEIGHT: 63 IN | BODY MASS INDEX: 28.1 KG/M2 | DIASTOLIC BLOOD PRESSURE: 72 MMHG

## 2023-09-26 DIAGNOSIS — Z01.419 WELL WOMAN EXAM: Primary | ICD-10-CM

## 2023-09-26 PROCEDURE — 99395 PREV VISIT EST AGE 18-39: CPT

## 2023-10-21 DIAGNOSIS — G47.9 SLEEP DISTURBANCE: ICD-10-CM

## 2023-10-23 RX ORDER — TRAZODONE HYDROCHLORIDE 50 MG/1
50 TABLET ORAL
Qty: 90 TABLET | Refills: 1 | Status: SHIPPED | OUTPATIENT
Start: 2023-10-23 | End: 2023-11-22

## 2023-11-03 ENCOUNTER — OFFICE VISIT (OUTPATIENT)
Dept: FAMILY MEDICINE CLINIC | Facility: CLINIC | Age: 18
End: 2023-11-03
Payer: COMMERCIAL

## 2023-11-03 VITALS
BODY MASS INDEX: 27.64 KG/M2 | HEIGHT: 63 IN | OXYGEN SATURATION: 99 % | HEART RATE: 81 BPM | TEMPERATURE: 97.6 F | DIASTOLIC BLOOD PRESSURE: 74 MMHG | WEIGHT: 156 LBS | SYSTOLIC BLOOD PRESSURE: 114 MMHG | RESPIRATION RATE: 18 BRPM

## 2023-11-03 DIAGNOSIS — Z00.00 ANNUAL PHYSICAL EXAM: Primary | ICD-10-CM

## 2023-11-03 DIAGNOSIS — F41.0 PANIC ATTACKS: ICD-10-CM

## 2023-11-03 DIAGNOSIS — F41.1 GENERALIZED ANXIETY DISORDER: ICD-10-CM

## 2023-11-03 DIAGNOSIS — E55.9 VITAMIN D DEFICIENCY: ICD-10-CM

## 2023-11-03 DIAGNOSIS — R79.0 LOW FERRITIN LEVEL: ICD-10-CM

## 2023-11-03 PROCEDURE — 99395 PREV VISIT EST AGE 18-39: CPT | Performed by: FAMILY MEDICINE

## 2023-11-03 RX ORDER — BUSPIRONE HYDROCHLORIDE 5 MG/1
5 TABLET ORAL 2 TIMES DAILY
COMMUNITY

## 2023-11-03 NOTE — PROGRESS NOTES
ADULT ANNUAL [facility]    NAME: Evy Cadena  AGE: 19 y.o. SEX: female  : 2005     DATE: 11/3/2023     Assessment and Plan:     Problem List Items Addressed This Visit        Other    Generalized anxiety disorder    Relevant Medications    busPIRone (BUSPAR) 5 mg tablet    Other Relevant Orders    TSH, 3rd generation with Free T4 reflex    Panic attacks    Low ferritin level    Relevant Orders    CBC and Platelet    Basic metabolic panel   Other Visit Diagnoses     Annual physical exam    -  Primary    Vitamin D deficiency        Relevant Orders    Vitamin D 25 hydroxy        Discussed oral iron but this causes gastric distress. Patient's father did not do well with iron infusions so she is not interested at this time. Remains on Lexapro. Situational anxiety at work we will start BuSpar 5 mg twice daily. Immunizations and preventive care screenings were discussed with patient today. Appropriate education was printed on patient's after visit summary. Counseling:  Alcohol/drug use: discussed moderation in alcohol intake, the recommendations for healthy alcohol use, and avoidance of illicit drug use. Dental Health: discussed importance of regular tooth brushing, flossing, and dental visits. Injury prevention: discussed safety/seat belts, safety helmets, smoke detectors, carbon dioxide detectors, and smoking near bedding or upholstery. Sexual health: discussed sexually transmitted diseases, partner selection, use of condoms, avoidance of unintended pregnancy, and contraceptive alternatives. Exercise: the importance of regular exercise/physical activity was discussed. Recommend exercise 3-5 times per week for at least 30 minutes. Depression Screening and Follow-up Plan: Patient was screened for depression during today's encounter. They screened negative with a PHQ-2 score of 0.         Return in about 1 year (around 11/3/2024) for Annual Physical 30 minutes. Chief Complaint:     Chief Complaint   Patient presents with   • Physical Exam      History of Present Illness:     Adult Annual Physical   Patient here for a comprehensive physical exam. The patient reports no problems. Diet and Physical Activity  Diet/Nutrition: well balanced diet. Exercise: walking. Depression Screening  PHQ-2/9 Depression Screening    Little interest or pleasure in doing things: 0 - not at all  Feeling down, depressed, or hopeless: 0 - not at all  PHQ-2 Score: 0  PHQ-2 Interpretation: Negative depression screen       General Health  Sleep: sleeps well and 6-7 hours. Trazodone helps . Hearing: normal - bilateral.  Vision: no vision problems. Dental: regular dental visits. /GYN Health  Follows with Obgyn   Switching to IUD in December          Review of Systems:     Review of Systems   Constitutional:  Negative for fatigue and fever. HENT:  Negative for sore throat. Eyes:  Negative for visual disturbance. Respiratory:  Negative for cough, chest tightness and shortness of breath. Cardiovascular:  Negative for chest pain, palpitations and leg swelling. Gastrointestinal:  Negative for abdominal pain, constipation, diarrhea and nausea. Endocrine: Negative for cold intolerance and heat intolerance. Genitourinary:  Negative for flank pain. Musculoskeletal:  Negative for back pain and neck pain. Skin:  Negative for rash. Neurological:  Negative for headaches. Psychiatric/Behavioral:  Positive for sleep disturbance. Negative for behavioral problems and confusion. The patient is nervous/anxious.        Past Medical History:     Past Medical History:   Diagnosis Date   • Anemia    • Anxiety 3/10/22   • Asthma    • Cancer [facility])    • Coronary artery disease    • Depression    • Diabetes mellitus ([address])    • GERD (gastroesophageal reflux disease)    • Headache(784.0)    • Hypertension    • Kidney stone    • Myocardial infarction [facility])    • Urinary tract infection       Past Surgical History:     Past Surgical History:   Procedure Laterality Date   • CHOLECYSTECTOMY  06-21-23   • CHOLECYSTECTOMY LAPAROSCOPIC N/A 06/21/2023    Procedure: CHOLECYSTECTOMY LAPAROSCOPIC: POSSIBLE OPEN;  Surgeon: Jasmin Velasco MD;  Location: [facility];  Service: General   • CORONARY ARTERY BYPASS GRAFT     • DENTAL SURGERY     • NO PAST SURGERIES        Social History:     Social History     Socioeconomic History   • Marital status: Single     Spouse name: None   • Number of children: None   • Years of education: None   • Highest education level: None   Occupational History   • None   Tobacco Use   • Smoking status: Never     Passive exposure: Never   • Smokeless tobacco: Never   Vaping Use   • Vaping Use: Every day   • Substances: Nicotine   Substance and Sexual Activity   • Alcohol use: No   • Drug use: No   • Sexual activity: Yes     Partners: Male     Birth control/protection: OCP     Comment: declines STD /HIV testing    Other Topics Concern   • None   Social History Narrative   • None     Social Determinants of Health     Financial Resource Strain: Not on file   Food Insecurity: Not on file   Transportation Needs: Not on file   Physical Activity: Not on file   Stress: Not on file   Social Connections: Not on file   Intimate Partner Violence: Not on file   Housing Stability: Not on file      Family History:     Family History   Problem Relation Age of Onset   • Heart disease Father    • Hypertension Father    • Diabetes Father    • Coronary artery disease Father    • Hyperlipidemia Father    • Deep vein thrombosis Father    • Heart attack Father    • Thyroid disease Father    • Ovarian cancer Maternal Grandmother    • Diabetes Maternal Grandmother    • Cancer Maternal Grandmother    • Arthritis Mother    • Anxiety disorder Mother    • No Known Problems Sister    • Dementia Maternal Grandfather       Current Medications:     Current Outpatient Medications   Medication Sig Dispense Refill   • albuterol (PROVENTIL HFA,VENTOLIN HFA) 90 mcg/act inhaler Inhale 2 puffs every 6 (six) hours as needed for wheezing 8 g 0   • ALPRAZolam (XANAX) 0.5 mg tablet Take 1 tablet (0.5 mg total) by mouth daily at bedtime as needed for anxiety for up to 10 days 10 tablet 0   • busPIRone (BUSPAR) 5 mg tablet Take 5 mg by mouth 2 (two) times a day     • escitalopram (LEXAPRO) 20 mg tablet TAKE 1 TABLET BY MOUTH EVERY DAY 90 tablet 2   • ondansetron (ZOFRAN) 4 mg tablet Take 1 tablet (4 mg total) by mouth every 8 (eight) hours as needed for nausea or vomiting 12 tablet 1   • traZODone (DESYREL) 50 mg tablet TAKE 1 TABLET (50 MG TOTAL) BY MOUTH DAILY AT BEDTIME AS NEEDED FOR SLEEP 90 tablet 1   • Xulane 150-35 MCG/24HR APPLY 1 PATCH ONE TIME PER WEEK 9 patch 1     No current facility-administered medications for this visit. Allergies: Allergies   Allergen Reactions   • Penicillins Rash      Physical Exam:     /74 (BP Location: Left arm, Patient Position: Sitting, Cuff Size: Standard)   Pulse 81   Temp 97.6 °F (36.4 °C) (Tympanic)   Resp 18   Ht 5' 2.5" (1.588 m)   Wt 70.8 kg (156 lb)   SpO2 99%   BMI 28.08 kg/m²     Physical Exam  Vitals and nursing note reviewed. Constitutional:       Appearance: Normal appearance. She is well-developed. HENT:      Head: Normocephalic and atraumatic. Eyes:      Extraocular Movements: Extraocular movements intact. Pupils: Pupils are equal, round, and reactive to light. Cardiovascular:      Rate and Rhythm: Normal rate and regular rhythm. Pulmonary:      Effort: Pulmonary effort is normal.      Breath sounds: Normal breath sounds. Abdominal:      General: Bowel sounds are normal.      Palpations: Abdomen is soft. Musculoskeletal:      Cervical back: Normal range of motion. Skin:     General: Skin is warm and dry. Neurological:      General: No focal deficit present.       Mental Status: She is alert and oriented to person, place, and time.    Psychiatric:         Mood and Affect: Mood normal.         Speech: Speech normal.         Behavior: Behavior normal.          Tom Abrams MD   [facility] [address]

## 2023-11-06 DIAGNOSIS — F41.1 GENERALIZED ANXIETY DISORDER: Primary | ICD-10-CM

## 2023-11-07 RX ORDER — BUSPIRONE HYDROCHLORIDE 5 MG/1
5 TABLET ORAL 2 TIMES DAILY
Qty: 60 TABLET | Refills: 0 | Status: SHIPPED | OUTPATIENT
Start: 2023-11-07

## 2023-12-01 DIAGNOSIS — F41.1 GENERALIZED ANXIETY DISORDER: ICD-10-CM

## 2023-12-01 RX ORDER — BUSPIRONE HYDROCHLORIDE 5 MG/1
5 TABLET ORAL 2 TIMES DAILY
Qty: 180 TABLET | Refills: 1 | Status: SHIPPED | OUTPATIENT
Start: 2023-12-01

## 2023-12-20 ENCOUNTER — APPOINTMENT (EMERGENCY)
Dept: CT IMAGING | Facility: HOSPITAL | Age: 18
End: 2023-12-20
Payer: COMMERCIAL

## 2023-12-20 ENCOUNTER — OFFICE VISIT (OUTPATIENT)
Dept: URGENT CARE | Facility: MEDICAL CENTER | Age: 18
End: 2023-12-20
Payer: COMMERCIAL

## 2023-12-20 ENCOUNTER — HOSPITAL ENCOUNTER (EMERGENCY)
Facility: HOSPITAL | Age: 18
Discharge: HOME | End: 2023-12-20
Attending: EMERGENCY MEDICINE
Payer: COMMERCIAL

## 2023-12-20 VITALS
BODY MASS INDEX: 29.08 KG/M2 | HEIGHT: 62 IN | WEIGHT: 158 LBS | RESPIRATION RATE: 18 BRPM | OXYGEN SATURATION: 99 % | TEMPERATURE: 98.1 F | HEART RATE: 78 BPM | SYSTOLIC BLOOD PRESSURE: 119 MMHG | DIASTOLIC BLOOD PRESSURE: 74 MMHG

## 2023-12-20 VITALS
HEART RATE: 98 BPM | OXYGEN SATURATION: 98 % | TEMPERATURE: 97.8 F | SYSTOLIC BLOOD PRESSURE: 128 MMHG | RESPIRATION RATE: 16 BRPM | DIASTOLIC BLOOD PRESSURE: 70 MMHG

## 2023-12-20 DIAGNOSIS — R10.2 PELVIC PAIN: Primary | ICD-10-CM

## 2023-12-20 DIAGNOSIS — R63.0 LOSS OF APPETITE: ICD-10-CM

## 2023-12-20 DIAGNOSIS — R10.30 LOWER ABDOMINAL PAIN: Primary | ICD-10-CM

## 2023-12-20 LAB
ALBUMIN SERPL BCP-MCNC: 3.9 G/DL (ref 3.5–5)
ALP SERPL-CCNC: 68 U/L (ref 34–104)
ALT SERPL W P-5'-P-CCNC: 10 U/L (ref 7–52)
ANION GAP SERPL CALCULATED.3IONS-SCNC: 8 MMOL/L
AST SERPL W P-5'-P-CCNC: 14 U/L (ref 13–39)
BASOPHILS # BLD AUTO: 0.02 THOUSANDS/ÂΜL (ref 0–0.1)
BASOPHILS NFR BLD AUTO: 0 % (ref 0–1)
BILIRUB SERPL-MCNC: 0.3 MG/DL (ref 0.2–1)
BILIRUB UR QL STRIP: NEGATIVE
BUN SERPL-MCNC: 9 MG/DL (ref 5–25)
CALCIUM SERPL-MCNC: 9.3 MG/DL (ref 8.4–10.2)
CHLORIDE SERPL-SCNC: 103 MMOL/L (ref 96–108)
CLARITY UR: CLEAR
CO2 SERPL-SCNC: 26 MMOL/L (ref 21–32)
COLOR UR: YELLOW
CREAT SERPL-MCNC: 0.7 MG/DL (ref 0.6–1.3)
EOSINOPHIL # BLD AUTO: 0.06 THOUSAND/ÂΜL (ref 0–0.61)
EOSINOPHIL NFR BLD AUTO: 1 % (ref 0–6)
ERYTHROCYTE [DISTWIDTH] IN BLOOD BY AUTOMATED COUNT: 12.4 % (ref 11.6–15.1)
EXT PREGNANCY TEST URINE: NEGATIVE
EXT. CONTROL: NORMAL
GFR SERPL CREATININE-BSD FRML MDRD: 125 ML/MIN/1.73SQ M
GLUCOSE SERPL-MCNC: 75 MG/DL (ref 65–140)
GLUCOSE UR STRIP-MCNC: NEGATIVE MG/DL
HCT VFR BLD AUTO: 36.8 % (ref 34.8–46.1)
HGB BLD-MCNC: 12.2 G/DL (ref 11.5–15.4)
HGB UR QL STRIP.AUTO: NEGATIVE
IMM GRANULOCYTES # BLD AUTO: 0.02 THOUSAND/UL (ref 0–0.2)
IMM GRANULOCYTES NFR BLD AUTO: 0 % (ref 0–2)
KETONES UR STRIP-MCNC: ABNORMAL MG/DL
LEUKOCYTE ESTERASE UR QL STRIP: NEGATIVE
LIPASE SERPL-CCNC: 9 U/L (ref 11–82)
LYMPHOCYTES # BLD AUTO: 2.7 THOUSANDS/ÂΜL (ref 0.6–4.47)
LYMPHOCYTES NFR BLD AUTO: 21 % (ref 14–44)
MCH RBC QN AUTO: 28.8 PG (ref 26.8–34.3)
MCHC RBC AUTO-ENTMCNC: 33.2 G/DL (ref 31.4–37.4)
MCV RBC AUTO: 87 FL (ref 82–98)
MONOCYTES # BLD AUTO: 0.77 THOUSAND/ÂΜL (ref 0.17–1.22)
MONOCYTES NFR BLD AUTO: 6 % (ref 4–12)
NEUTROPHILS # BLD AUTO: 9.08 THOUSANDS/ÂΜL (ref 1.85–7.62)
NEUTS SEG NFR BLD AUTO: 72 % (ref 43–75)
NITRITE UR QL STRIP: NEGATIVE
NRBC BLD AUTO-RTO: 0 /100 WBCS
PH UR STRIP.AUTO: 5.5 [PH]
PLATELET # BLD AUTO: 334 THOUSANDS/UL (ref 149–390)
PMV BLD AUTO: 9.7 FL (ref 8.9–12.7)
POTASSIUM SERPL-SCNC: 3.8 MMOL/L (ref 3.5–5.3)
PROT SERPL-MCNC: 6.7 G/DL (ref 6.4–8.4)
PROT UR STRIP-MCNC: NEGATIVE MG/DL
RBC # BLD AUTO: 4.23 MILLION/UL (ref 3.81–5.12)
SL AMB  POCT GLUCOSE, UA: NORMAL
SL AMB LEUKOCYTE ESTERASE,UA: NORMAL
SL AMB POCT BILIRUBIN,UA: NORMAL
SL AMB POCT BLOOD,UA: NORMAL
SL AMB POCT CLARITY,UA: CLEAR
SL AMB POCT COLOR,UA: YELLOW
SL AMB POCT KETONES,UA: NORMAL
SL AMB POCT NITRITE,UA: NORMAL
SL AMB POCT PH,UA: 6
SL AMB POCT SPECIFIC GRAVITY,UA: 1015
SL AMB POCT URINE HCG: NEGATIVE
SL AMB POCT URINE PROTEIN: NORMAL
SL AMB POCT UROBILINOGEN: 0.2
SODIUM SERPL-SCNC: 137 MMOL/L (ref 135–147)
SP GR UR STRIP.AUTO: >=1.03 (ref 1–1.03)
UROBILINOGEN UR QL STRIP.AUTO: 0.2 E.U./DL
WBC # BLD AUTO: 12.65 THOUSAND/UL (ref 4.31–10.16)

## 2023-12-20 PROCEDURE — 96361 HYDRATE IV INFUSION ADD-ON: CPT

## 2023-12-20 PROCEDURE — 85025 COMPLETE CBC W/AUTO DIFF WBC: CPT | Performed by: PHYSICIAN ASSISTANT

## 2023-12-20 PROCEDURE — 99213 OFFICE O/P EST LOW 20 MIN: CPT

## 2023-12-20 PROCEDURE — 81002 URINALYSIS NONAUTO W/O SCOPE: CPT

## 2023-12-20 PROCEDURE — 80053 COMPREHEN METABOLIC PANEL: CPT | Performed by: PHYSICIAN ASSISTANT

## 2023-12-20 PROCEDURE — 81003 URINALYSIS AUTO W/O SCOPE: CPT | Performed by: PHYSICIAN ASSISTANT

## 2023-12-20 PROCEDURE — 99285 EMERGENCY DEPT VISIT HI MDM: CPT | Performed by: PHYSICIAN ASSISTANT

## 2023-12-20 PROCEDURE — 83690 ASSAY OF LIPASE: CPT | Performed by: PHYSICIAN ASSISTANT

## 2023-12-20 PROCEDURE — 99284 EMERGENCY DEPT VISIT MOD MDM: CPT

## 2023-12-20 PROCEDURE — 96360 HYDRATION IV INFUSION INIT: CPT

## 2023-12-20 PROCEDURE — 81025 URINE PREGNANCY TEST: CPT | Performed by: PHYSICIAN ASSISTANT

## 2023-12-20 PROCEDURE — 74177 CT ABD & PELVIS W/CONTRAST: CPT

## 2023-12-20 PROCEDURE — G1004 CDSM NDSC: HCPCS

## 2023-12-20 PROCEDURE — 36415 COLL VENOUS BLD VENIPUNCTURE: CPT | Performed by: PHYSICIAN ASSISTANT

## 2023-12-20 PROCEDURE — 81025 URINE PREGNANCY TEST: CPT

## 2023-12-20 RX ORDER — KETOROLAC TROMETHAMINE 30 MG/ML
15 INJECTION, SOLUTION INTRAMUSCULAR; INTRAVENOUS ONCE
Status: DISCONTINUED | OUTPATIENT
Start: 2023-12-20 | End: 2023-12-20 | Stop reason: HOSPADM

## 2023-12-20 RX ADMIN — IOHEXOL 100 ML: 350 INJECTION, SOLUTION INTRAVENOUS at 14:00

## 2023-12-20 RX ADMIN — SODIUM CHLORIDE 1000 ML: 0.9 INJECTION, SOLUTION INTRAVENOUS at 13:08

## 2023-12-20 NOTE — PROGRESS NOTES
[facility]        NAME: Elisa Mcneill is a 18 y.o. female  : 2005    MRN: [id number]  DATE: 2023  TIME: 11:56 AM    Assessment and Plan   Pelvic pain [R10.2]  1. Pelvic pain  POCT urine dip    POCT urine HCG    Transfer to other facility      2. Loss of appetite  Transfer to other facility        POCT urine pregnancy: Negative    POCT urine dip:  Color: Yellow  UA, Clarity: Clear  Glucose: Negative  Bilirubin: Negative  Ketones: Negative  Specific gravity: 1.015  Blood: Negative  pH: 6.0  Protein: Negative  Urobilinogen: 0.2  Nitrates: Negative  Leukocytes: Negative    Informed by [facility] that their US is currently down with possible anticipation of their equipment being up an running by 1500.    Called pt at 1150, this provider notified patient of this information.     Offered pt alternative sites that she may proceed to in the event the receiving provider would order an US. Pt verbalized understand and would like to continue to proceed to the [facility].     Patient Instructions     Please proceed to the ER for further evaluation and treatment.     Chief Complaint     Chief Complaint   Patient presents with    Pelvic Pain     Started Monday with pelvic pain coming and going.  Has pelvic pressure that doesn't go away.  No burning or frequency when urinating.     History of Present Illness       Pelvic Pain  The patient's primary symptoms include pelvic pain (Pain started on left and now is across her lower abdomen). This is a new problem. The current episode started in the past 7 days (Monday). The problem occurs constantly. The problem has been gradually worsening. The pain is severe. The problem affects both sides. She is not pregnant. Associated symptoms include abdominal pain. Pertinent negatives include no chills, constipation, diarrhea, dysuria, fever, flank pain, frequency, hematuria, nausea, urgency or vomiting. Her menstrual history has been regular. There  is no history of ovarian cysts.       Review of Systems   Review of Systems   Constitutional:  Positive for appetite change. Negative for activity change, chills, diaphoresis, fatigue and fever.   Respiratory: Negative.  Negative for cough, shortness of breath and wheezing.    Cardiovascular: Negative.  Negative for chest pain and palpitations.   Gastrointestinal:  Positive for abdominal pain. Negative for abdominal distention, blood in stool, constipation, diarrhea, nausea and vomiting.        Last BM 12/19/2023   Genitourinary:  Positive for pelvic pain (Pain started on left and now is across her lower abdomen). Negative for decreased urine volume, difficulty urinating, dysuria, flank pain, frequency, hematuria, menstrual problem, urgency and vaginal bleeding.   Musculoskeletal:  Negative for myalgias.   Skin: Negative.  Negative for color change and wound.         Current Medications       Current Outpatient Medications:     albuterol (PROVENTIL HFA,VENTOLIN HFA) 90 mcg/act inhaler, Inhale 2 puffs every 6 (six) hours as needed for wheezing, Disp: 8 g, Rfl: 0    ALPRAZolam (XANAX) 0.5 mg tablet, Take 1 tablet (0.5 mg total) by mouth daily at bedtime as needed for anxiety for up to 10 days, Disp: 10 tablet, Rfl: 0    busPIRone (BUSPAR) 5 mg tablet, TAKE 1 TABLET BY MOUTH TWICE A DAY, Disp: 180 tablet, Rfl: 1    escitalopram (LEXAPRO) 20 mg tablet, TAKE 1 TABLET BY MOUTH EVERY DAY, Disp: 90 tablet, Rfl: 2    ondansetron (ZOFRAN) 4 mg tablet, Take 1 tablet (4 mg total) by mouth every 8 (eight) hours as needed for nausea or vomiting, Disp: 12 tablet, Rfl: 1    Xulane 150-35 MCG/24HR, APPLY 1 PATCH ONE TIME PER WEEK, Disp: 9 patch, Rfl: 1    traZODone (DESYREL) 50 mg tablet, TAKE 1 TABLET (50 MG TOTAL) BY MOUTH DAILY AT BEDTIME AS NEEDED FOR SLEEP, Disp: 90 tablet, Rfl: 1    Current Allergies     Allergies as of 12/20/2023 - Reviewed 12/20/2023   Allergen Reaction Noted    Penicillins Rash 03/06/2016            The  "following portions of the patient's history were reviewed and updated as appropriate: allergies, current medications, past family history, past medical history, past social history, past surgical history and problem list.     Past Medical History:   Diagnosis Date    Anemia     Anxiety 3/10/22    Asthma     Cancer (HCC)     Coronary artery disease     Depression     Diabetes mellitus (HCC)     GERD (gastroesophageal reflux disease)     Headache(784.0)     Hypertension     Kidney stone     Myocardial infarction (HCC)     Urinary tract infection        Past Surgical History:   Procedure Laterality Date    CHOLECYSTECTOMY  06-21-23    CHOLECYSTECTOMY LAPAROSCOPIC N/A 06/21/2023    Procedure: CHOLECYSTECTOMY LAPAROSCOPIC: POSSIBLE OPEN;  Surgeon: Kristian Mcmahon MD;  Location[facility];  Service: General    CORONARY ARTERY BYPASS GRAFT      DENTAL SURGERY      NO PAST SURGERIES         Family History   Problem Relation Age of Onset    Heart disease Father     Hypertension Father     Diabetes Father     Coronary artery disease Father     Hyperlipidemia Father     Deep vein thrombosis Father     Heart attack Father     Thyroid disease Father     Ovarian cancer Maternal Grandmother     Diabetes Maternal Grandmother     Cancer Maternal Grandmother     Arthritis Mother     Anxiety disorder Mother     No Known Problems Sister     Dementia Maternal Grandfather          Medications have been verified.        Objective   /74   Pulse 78   Temp 98.1 °F (36.7 °C) (Temporal)   Resp 18   Ht 5' 2\" (1.575 m)   Wt 71.7 kg (158 lb)   SpO2 99%   BMI 28.90 kg/m²        Physical Exam     Physical Exam  Vitals and nursing note reviewed.   Constitutional:       General: She is not in acute distress.     Appearance: Normal appearance. She is not ill-appearing, toxic-appearing or diaphoretic.   HENT:      Head: Normocephalic.   Cardiovascular:      Rate and Rhythm: Normal rate and regular rhythm.      Pulses: Normal pulses. "      Heart sounds: Normal heart sounds. No murmur heard.  Pulmonary:      Effort: Pulmonary effort is normal. No respiratory distress.      Breath sounds: Normal breath sounds. No stridor. No wheezing, rhonchi or rales.   Chest:      Chest wall: No tenderness.   Abdominal:      General: Abdomen is flat. Bowel sounds are decreased. There is no distension.      Palpations: Abdomen is soft.      Tenderness: There is abdominal tenderness in the right lower quadrant, suprapubic area and left lower quadrant. There is no right CVA tenderness, left CVA tenderness, guarding or rebound. Negative signs include Rovsing's sign.   Neurological:      Mental Status: She is alert.

## 2023-12-20 NOTE — ED PROVIDER NOTES
History  Chief Complaint   Patient presents with    Pelvic Pain     Intermittent lower abdominal/ pelvis pain x3 days. Pt states she was seen at an Urgent Care today and they did a urine test which was - and was instructed to come to the ED. Denies abnormal vaginal bleeding/ discharge.      Past Medical History: Anemia, Anxiety, Asthma, Cancer, Coronary artery disease, Depression, Diabetes mellitus, GERD, Headache,  HTN, Kidney stone, Myocardial infarction,  Past Surgical History: Lap CHOLECYSTECTOMY, CORONARY ARTERY BYPASS GRAFT, DENTAL SURGERY    Pt presents to ED c/o 3 day h/o Intermittent, but persistent, diffuse lower abdominal pain worse on right side with no associated fever, uri sx, cp, sob, urinary complaints, bowel changes, no abnormal vag bleeding or discharge  Pt seen at urgent care, they did preg which was negative and then referred to ED          Prior to Admission Medications   Prescriptions Last Dose Informant Patient Reported? Taking?   ALPRAZolam (XANAX) 0.5 mg tablet  Self No No   Sig: Take 1 tablet (0.5 mg total) by mouth daily at bedtime as needed for anxiety for up to 10 days   Xulane 150-35 MCG/24HR  Self No No   Sig: APPLY 1 PATCH ONE TIME PER WEEK   albuterol (PROVENTIL HFA,VENTOLIN HFA) 90 mcg/act inhaler  Self No No   Sig: Inhale 2 puffs every 6 (six) hours as needed for wheezing   busPIRone (BUSPAR) 5 mg tablet   No No   Sig: TAKE 1 TABLET BY MOUTH TWICE A DAY   escitalopram (LEXAPRO) 20 mg tablet  Self No No   Sig: TAKE 1 TABLET BY MOUTH EVERY DAY   ondansetron (ZOFRAN) 4 mg tablet  Self No No   Sig: Take 1 tablet (4 mg total) by mouth every 8 (eight) hours as needed for nausea or vomiting   traZODone (DESYREL) 50 mg tablet  Self No No   Sig: TAKE 1 TABLET (50 MG TOTAL) BY MOUTH DAILY AT BEDTIME AS NEEDED FOR SLEEP      Facility-Administered Medications: None       Past Medical History:   Diagnosis Date    Anxiety 3/10/22    Depression     Headache(784.0)     Urinary tract infection         Past Surgical History:   Procedure Laterality Date    CHOLECYSTECTOMY  06-21-23    CHOLECYSTECTOMY LAPAROSCOPIC N/A 06/21/2023    Procedure: CHOLECYSTECTOMY LAPAROSCOPIC: POSSIBLE OPEN;  Surgeon: Kristian Mcmahon MD;  Location[facility];  Service: General    DENTAL SURGERY         Family History   Problem Relation Age of Onset    Heart disease Father     Hypertension Father     Diabetes Father     Coronary artery disease Father     Hyperlipidemia Father     Deep vein thrombosis Father     Heart attack Father     Thyroid disease Father     Ovarian cancer Maternal Grandmother     Diabetes Maternal Grandmother     Cancer Maternal Grandmother     Arthritis Mother     Anxiety disorder Mother     No Known Problems Sister     Dementia Maternal Grandfather      I have reviewed and agree with the history as documented.    E-Cigarette/Vaping    E-Cigarette Use Current Every Day User     Cartridges/Day 0.5      E-Cigarette/Vaping Substances    Nicotine Yes      Social History     Tobacco Use    Smoking status: Never     Passive exposure: Never    Smokeless tobacco: Never   Vaping Use    Vaping status: Every Day    Substances: Nicotine   Substance Use Topics    Alcohol use: No    Drug use: No       Review of Systems   Constitutional:  Negative for fever.   HENT:  Negative for sore throat.    Eyes:  Negative for visual disturbance.   Respiratory:  Negative for cough and shortness of breath.    Cardiovascular:  Negative for chest pain and leg swelling.   Gastrointestinal:  Positive for abdominal pain. Negative for constipation, diarrhea, nausea and vomiting.   Genitourinary:  Negative for difficulty urinating, dysuria, frequency, hematuria, vaginal bleeding and vaginal discharge.   Musculoskeletal:  Negative for arthralgias and myalgias.   Skin:  Negative for rash.   Neurological:  Negative for dizziness, weakness and headaches.   Psychiatric/Behavioral:  Negative for behavioral problems.    All other systems reviewed  and are negative.      Physical Exam  Physical Exam  Vitals and nursing note reviewed.   Constitutional:       Appearance: She is well-developed.   HENT:      Head: Normocephalic and atraumatic.      Right Ear: External ear normal.      Left Ear: External ear normal.      Nose: Nose normal.      Mouth/Throat:      Mouth: Mucous membranes are moist.      Pharynx: Oropharynx is clear.   Eyes:      Conjunctiva/sclera: Conjunctivae normal.   Cardiovascular:      Rate and Rhythm: Normal rate and regular rhythm.   Pulmonary:      Effort: Pulmonary effort is normal.      Breath sounds: Normal breath sounds.   Abdominal:      General: Bowel sounds are normal.      Palpations: Abdomen is soft.      Tenderness: There is abdominal tenderness (mild RLQ abd pain). There is no guarding or rebound.   Genitourinary:     Comments: deferred  Musculoskeletal:         General: Normal range of motion.      Cervical back: Normal range of motion.   Skin:     General: Skin is warm and dry.   Neurological:      General: No focal deficit present.      Mental Status: She is alert.      Motor: No weakness.   Psychiatric:         Behavior: Behavior normal.         Vital Signs  ED Triage Vitals   Temperature Pulse Respirations Blood Pressure SpO2   12/20/23 1224 12/20/23 1221 12/20/23 1221 12/20/23 1221 12/20/23 1221   97.8 °F (36.6 °C) 98 16 128/70 98 %      Temp Source Heart Rate Source Patient Position - Orthostatic VS BP Location FiO2 (%)   12/20/23 1224 12/20/23 1221 12/20/23 1221 12/20/23 1221 --   Oral Monitor Sitting Right arm       Pain Score       12/20/23 1221       3           Vitals:    12/20/23 1221   BP: 128/70   Pulse: 98   Patient Position - Orthostatic VS: Sitting         Visual Acuity      ED Medications  Medications   ketorolac (TORADOL) injection 15 mg (0 mg Intravenous Hold 12/20/23 1308)   sodium chloride 0.9 % bolus 1,000 mL (1,000 mL Intravenous New Bag 12/20/23 1308)   iohexol (OMNIPAQUE) 350 MG/ML injection  (SINGLE-DOSE) 100 mL (100 mL Intravenous Given 12/20/23 1400)       Diagnostic Studies  Results Reviewed       Procedure Component Value Units Date/Time    Comprehensive metabolic panel [[id number]] Collected: 12/20/23 1307    Lab Status: Final result Specimen: Blood from Arm, Left Updated: 12/20/23 1337     Sodium 137 mmol/L      Potassium 3.8 mmol/L      Chloride 103 mmol/L      CO2 26 mmol/L      ANION GAP 8 mmol/L      BUN 9 mg/dL      Creatinine 0.70 mg/dL      Glucose 75 mg/dL      Calcium 9.3 mg/dL      AST 14 U/L      ALT 10 U/L      Alkaline Phosphatase 68 U/L      Total Protein 6.7 g/dL      Albumin 3.9 g/dL      Total Bilirubin 0.30 mg/dL      eGFR 125 ml/min/1.73sq m     Narrative:      National Kidney Disease Foundation guidelines for Chronic Kidney Disease (CKD):     Stage 1 with normal or high GFR (GFR > 90 mL/min/1.73 square meters)    Stage 2 Mild CKD (GFR = 60-89 mL/min/1.73 square meters)    Stage 3A Moderate CKD (GFR = 45-59 mL/min/1.73 square meters)    Stage 3B Moderate CKD (GFR = 30-44 mL/min/1.73 square meters)    Stage 4 Severe CKD (GFR = 15-29 mL/min/1.73 square meters)    Stage 5 End Stage CKD (GFR <15 mL/min/1.73 square meters)  Note: GFR calculation is accurate only with a steady state creatinine    Lipase [[id number]]  (Abnormal) Collected: 12/20/23 1307    Lab Status: Final result Specimen: Blood from Arm, Left Updated: 12/20/23 1337     Lipase 9 u/L     CBC and differential [[id number]]  (Abnormal) Collected: 12/20/23 1307    Lab Status: Final result Specimen: Blood from Arm, Left Updated: 12/20/23 1322     WBC 12.65 Thousand/uL      RBC 4.23 Million/uL      Hemoglobin 12.2 g/dL      Hematocrit 36.8 %      MCV 87 fL      MCH 28.8 pg      MCHC 33.2 g/dL      RDW 12.4 %      MPV 9.7 fL      Platelets 334 Thousands/uL      nRBC 0 /100 WBCs      Neutrophils Relative 72 %      Immat GRANS % 0 %      Lymphocytes Relative 21 %      Monocytes Relative 6 %      Eosinophils Relative 1 %       Basophils Relative 0 %      Neutrophils Absolute 9.08 Thousands/µL      Immature Grans Absolute 0.02 Thousand/uL      Lymphocytes Absolute 2.70 Thousands/µL      Monocytes Absolute 0.77 Thousand/µL      Eosinophils Absolute 0.06 Thousand/µL      Basophils Absolute 0.02 Thousands/µL     UA w Reflex to Microscopic w Reflex to Culture [[id number]]  (Abnormal) Collected: 12/20/23 1256    Lab Status: Final result Specimen: Urine, Clean Catch Updated: 12/20/23 1312     Color, UA Yellow     Clarity, UA Clear     Specific Gravity, UA >=1.030     pH, UA 5.5     Leukocytes, UA Negative     Nitrite, UA Negative     Protein, UA Negative mg/dl      Glucose, UA Negative mg/dl      Ketones, UA Trace mg/dl      Urobilinogen, UA 0.2 E.U./dl      Bilirubin, UA Negative     Occult Blood, UA Negative    POCT pregnancy, urine [[id number]]  (Normal) Resulted: 12/20/23 1256    Lab Status: Final result Updated: 12/20/23 1256     EXT Preg Test, Ur Negative     Control Valid                   CT abdomen pelvis with contrast   Final Result by Bruno Valdez MD (12/20 1444)      No acute inflammatory findings in the abdomen or pelvis. The appendix is noninflamed.            Workstation performed: [id number]                    Procedures  Procedures         ED Course  ED Course as of 12/20/23 1504   Wed Dec 20, 2023   1306 PREGNANCY TEST URINE: Negative   1408 WBC(!): 12.65  Mild leukocytosis   1445 Lipase(!): 9  normal   1445 Cmp unremarkable     1445 UA; NO UTI   1450 Eval wnl, discussed with attending, who evaluated pt in ED.                                             Medical Decision Making  Patient with abd pain with no other symptoms  Due to right lower quadrant abd pain, urgent care was concerned of appendicitis , so sent patient to emergency department.  Patient with mild right lower quadrant abdominal pain, no guarding, no referred tenderness, no peritoneal signs but will do CAT scan based on expectation of patient,  labs  Patient feeling better evaluation normal reevaluated by ER doctor who agrees with plan to discharge follow-up as needed    Amount and/or Complexity of Data Reviewed  Labs: ordered. Decision-making details documented in ED Course.  Radiology: ordered.  ECG/medicine tests:  Decision-making details documented in ED Course.  Discussion of management or test interpretation with external provider(s): Case discussed with attending who saw pt    Risk  Prescription drug management.             Disposition  Final diagnoses:   Lower abdominal pain     Time reflects when diagnosis was documented in both MDM as applicable and the Disposition within this note       Time User Action Codes Description Comment    12/20/2023  2:59 PM Quiana Gaitan Add [R10.30] Lower abdominal pain           ED Disposition       ED Disposition   Discharge    Condition   Stable    Date/Time   Wed Dec 20, 2023  2:58 PM    Comment   Elisa Mcneill discharge to home/self care.                   Follow-up Information       Follow up With Specialties Details Why Contact Info    Kings Laboy MD Family Medicine   [address] [place] [address] [id number] [phone]              Patient's Medications   Discharge Prescriptions    No medications on file       No discharge procedures on file.    PDMP Review         Value Time User    PDMP Reviewed  Yes 9/7/2023 10:20 AM Kings Laboy MD            ED Provider  Electronically Signed by             Quiana Gaitan PA-C  12/20/23 3104

## 2023-12-20 NOTE — DISCHARGE INSTRUCTIONS
Use Tylenol every 4 hours or Motrin every 6 hours; you can alternate the 2 medications taking something every 3 hours for pain or fever.      Follow-up with your doctor in next few days.

## 2024-02-08 ENCOUNTER — OFFICE VISIT (OUTPATIENT)
Dept: FAMILY MEDICINE CLINIC | Facility: CLINIC | Age: 19
End: 2024-02-08
Payer: COMMERCIAL

## 2024-02-08 VITALS
RESPIRATION RATE: 18 BRPM | DIASTOLIC BLOOD PRESSURE: 70 MMHG | TEMPERATURE: 98.4 F | BODY MASS INDEX: 28.61 KG/M2 | HEART RATE: 121 BPM | WEIGHT: 155.5 LBS | HEIGHT: 62 IN | OXYGEN SATURATION: 98 % | SYSTOLIC BLOOD PRESSURE: 126 MMHG

## 2024-02-08 DIAGNOSIS — J01.00 ACUTE NON-RECURRENT MAXILLARY SINUSITIS: Primary | ICD-10-CM

## 2024-02-08 LAB
SARS-COV-2 AG UPPER RESP QL IA: NEGATIVE
VALID CONTROL: NORMAL

## 2024-02-08 PROCEDURE — 87811 SARS-COV-2 COVID19 W/OPTIC: CPT | Performed by: FAMILY MEDICINE

## 2024-02-08 PROCEDURE — 99213 OFFICE O/P EST LOW 20 MIN: CPT | Performed by: FAMILY MEDICINE

## 2024-02-08 NOTE — PROGRESS NOTES
"Name: Elisa Mcneill      : 2005      MRN: [id number]  Encounter Provider: Kings Laboy MD  Encounter Date: 2024   Encounter department: [facility]    Assessment & Plan     1. Acute non-recurrent maxillary sinusitis  -     POCT Rapid Covid Ag    Suspect viral sinusitis.  Start Flonase.  Recommend NeilMed sinus rinse.  Sudafed as needed.  If no improvement patient will reach out through MyChart and we can discuss starting an antibiotic       Subjective      Patient presents today with sinus pressure and congestion for the past 2 days.  Feels pain in her teeth.  Has tried Zyrtec.  Denies any fevers nausea vomiting or diarrhea.  Slight cough worse in the morning.      Review of Systems   Constitutional:  Positive for fatigue.   HENT:  Positive for congestion, rhinorrhea and sinus pressure. Negative for sore throat.    Respiratory:  Positive for cough.    Neurological:  Positive for headaches.       Current Outpatient Medications on File Prior to Visit   Medication Sig   • albuterol (PROVENTIL HFA,VENTOLIN HFA) 90 mcg/act inhaler Inhale 2 puffs every 6 (six) hours as needed for wheezing   • ALPRAZolam (XANAX) 0.5 mg tablet Take 1 tablet (0.5 mg total) by mouth daily at bedtime as needed for anxiety for up to 10 days   • escitalopram (LEXAPRO) 20 mg tablet TAKE 1 TABLET BY MOUTH EVERY DAY   • ondansetron (ZOFRAN) 4 mg tablet Take 1 tablet (4 mg total) by mouth every 8 (eight) hours as needed for nausea or vomiting   • traZODone (DESYREL) 50 mg tablet TAKE 1 TABLET (50 MG TOTAL) BY MOUTH DAILY AT BEDTIME AS NEEDED FOR SLEEP   • Xulane 150-35 MCG/24HR APPLY 1 PATCH ONE TIME PER WEEK   • busPIRone (BUSPAR) 5 mg tablet TAKE 1 TABLET BY MOUTH TWICE A DAY (Patient not taking: Reported on 2024)       Objective     /70 (BP Location: Left arm, Patient Position: Sitting, Cuff Size: Standard)   Pulse (!) 121   Temp 98.4 °F (36.9 °C)   Resp 18   Ht 5' 2\" (1.575 m)   Wt 70.5 kg " (155 lb 8 oz)   SpO2 98%   BMI 28.44 kg/m²     Physical Exam  Vitals and nursing note reviewed.   Constitutional:       Appearance: She is well-developed.   HENT:      Head: Normocephalic and atraumatic.      Nose: Mucosal edema, congestion and rhinorrhea present.      Right Sinus: No maxillary sinus tenderness.      Left Sinus: No maxillary sinus tenderness.   Cardiovascular:      Rate and Rhythm: Normal rate and regular rhythm.   Pulmonary:      Effort: Pulmonary effort is normal.      Breath sounds: Normal breath sounds.   Neurological:      General: No focal deficit present.      Mental Status: She is alert.   Psychiatric:         Mood and Affect: Mood normal.         Behavior: Behavior normal.       Kings Laboy MD

## 2024-03-07 DIAGNOSIS — N94.6 DYSMENORRHEA IN ADOLESCENT: ICD-10-CM

## 2024-03-07 DIAGNOSIS — N92.0 MENORRHAGIA WITH REGULAR CYCLE: ICD-10-CM

## 2024-03-07 RX ORDER — NORELGESTROMIN AND ETHINYL ESTRADIOL 150; 35 UG/D; UG/D
PATCH TRANSDERMAL
Qty: 9 PATCH | Refills: 1 | Status: SHIPPED | OUTPATIENT
Start: 2024-03-07

## 2024-04-04 DIAGNOSIS — G47.9 SLEEP DISTURBANCE: ICD-10-CM

## 2024-04-04 RX ORDER — TRAZODONE HYDROCHLORIDE 50 MG/1
50 TABLET ORAL
Qty: 90 TABLET | Refills: 1 | Status: SHIPPED | OUTPATIENT
Start: 2024-04-04 | End: 2024-10-01

## 2024-06-18 ENCOUNTER — APPOINTMENT (OUTPATIENT)
Dept: RADIOLOGY | Facility: MEDICAL CENTER | Age: 19
End: 2024-06-18
Payer: COMMERCIAL

## 2024-06-18 ENCOUNTER — OFFICE VISIT (OUTPATIENT)
Dept: FAMILY MEDICINE CLINIC | Facility: CLINIC | Age: 19
End: 2024-06-18
Payer: COMMERCIAL

## 2024-06-18 VITALS
WEIGHT: 162 LBS | SYSTOLIC BLOOD PRESSURE: 124 MMHG | BODY MASS INDEX: 29.81 KG/M2 | TEMPERATURE: 97.8 F | RESPIRATION RATE: 16 BRPM | HEART RATE: 112 BPM | HEIGHT: 62 IN | OXYGEN SATURATION: 98 % | DIASTOLIC BLOOD PRESSURE: 72 MMHG

## 2024-06-18 DIAGNOSIS — F41.1 GENERALIZED ANXIETY DISORDER: ICD-10-CM

## 2024-06-18 DIAGNOSIS — M79.671 ACUTE PAIN OF RIGHT FOOT: Primary | ICD-10-CM

## 2024-06-18 DIAGNOSIS — M79.671 ACUTE PAIN OF RIGHT FOOT: ICD-10-CM

## 2024-06-18 DIAGNOSIS — R00.2 HEART PALPITATIONS: ICD-10-CM

## 2024-06-18 PROCEDURE — 73630 X-RAY EXAM OF FOOT: CPT

## 2024-06-18 PROCEDURE — 99214 OFFICE O/P EST MOD 30 MIN: CPT | Performed by: FAMILY MEDICINE

## 2024-06-18 RX ORDER — MELOXICAM 7.5 MG/1
7.5 TABLET ORAL DAILY PRN
Qty: 14 TABLET | Refills: 0 | Status: SHIPPED | OUTPATIENT
Start: 2024-06-18 | End: 2024-07-02

## 2024-06-18 RX ORDER — ESCITALOPRAM OXALATE 20 MG/1
20 TABLET ORAL DAILY
Qty: 90 TABLET | Refills: 3 | Status: SHIPPED | OUTPATIENT
Start: 2024-06-18

## 2024-06-18 RX ORDER — METOPROLOL SUCCINATE 25 MG/1
12.5 TABLET, EXTENDED RELEASE ORAL DAILY
Qty: 15 TABLET | Refills: 2 | Status: SHIPPED | OUTPATIENT
Start: 2024-06-18 | End: 2024-09-16

## 2024-06-18 NOTE — PROGRESS NOTES
Ambulatory Visit  Name: Elisa Mcneill      : 2005      MRN: [id number]  Encounter Provider: Kings Laboy MD  Encounter Date: 2024   Encounter department: [facility]    Assessment & Plan   1. Acute pain of right foot  -     meloxicam (MOBIC) 7.5 mg tablet; Take 1 tablet (7.5 mg total) by mouth daily as needed for moderate pain for up to 14 days  -     XR foot 3+ vw right; Future; Expected date: 2024  2. Generalized anxiety disorder  -     escitalopram (LEXAPRO) 20 mg tablet; Take 1 tablet (20 mg total) by mouth daily  3. Heart palpitations  -     metoprolol succinate (TOPROL-XL) 25 mg 24 hr tablet; Take 0.5 tablets (12.5 mg total) by mouth daily    Suspect pain in the right foot is extensor tendinitis.  Will obtain x-ray of the right foot since she does have difficulty walking.  Start meloxicam once daily for the next 7 days.  Recommend rest ice and elevation.  Recommend wearing supportive footwear.  If no improvement will refer patient to podiatry for further evaluation.  Continues to have anxiety with heart palpitations.  Heart rate consistently elevated.  Will trial her on a low-dose of metoprolol 12.5 mg daily.  Monitor blood pressure closely.  If unable to tolerate stop medication.  She will send us a [email or web address] message updating us on her response     History of Present Illness     Patient presents with:  Foot Injury: Pain in the foot when he walk    Pain in the right foot only with walking.  Pain is at the big toe up to the top of the foot feels like it is her tendon versus a bone.  Describes it as a sharp pain if she stops walking the pain will linger for but then go away.  Has tried different shoes but nothing has improved the pain.  Has taken some Tylenol no ibuprofen.        Review of Systems   Constitutional:  Negative for activity change, fatigue and fever.   Eyes:  Negative for visual disturbance.   Respiratory:  Negative for shortness of breath.   "  Cardiovascular:  Positive for palpitations. Negative for chest pain.   Gastrointestinal:  Negative for abdominal pain, constipation, diarrhea and nausea.   Endocrine: Negative for cold intolerance and heat intolerance.   Musculoskeletal:  Negative for back pain.        Right foot pain      Skin:  Negative for rash.   Neurological:  Negative for headaches.   Psychiatric/Behavioral:  Negative for confusion. The patient is nervous/anxious.        Objective     /72 (BP Location: Left arm, Patient Position: Sitting, Cuff Size: Standard)   Pulse (!) 112   Temp 97.8 °F (36.6 °C) (Temporal)   Resp 16   Ht 5' 2\" (1.575 m)   Wt 73.5 kg (162 lb)   SpO2 98%   BMI 29.63 kg/m²     Physical Exam  Cardiovascular:      Rate and Rhythm: Regular rhythm. Tachycardia present.   Musculoskeletal:         General: Tenderness present. No swelling or deformity.      Comments: Strength 5/5.  No bony tenderness.  Mild pain with flexion and extension right foot       Administrative Statements     "

## 2024-06-30 ENCOUNTER — OFFICE VISIT (OUTPATIENT)
Dept: URGENT CARE | Facility: MEDICAL CENTER | Age: 19
End: 2024-06-30
Payer: COMMERCIAL

## 2024-06-30 VITALS
OXYGEN SATURATION: 97 % | TEMPERATURE: 98 F | DIASTOLIC BLOOD PRESSURE: 71 MMHG | RESPIRATION RATE: 18 BRPM | WEIGHT: 164 LBS | HEART RATE: 95 BPM | BODY MASS INDEX: 30.18 KG/M2 | SYSTOLIC BLOOD PRESSURE: 122 MMHG | HEIGHT: 62 IN

## 2024-06-30 DIAGNOSIS — M19.079 INFLAMMATION OF FOOT JOINT: Primary | ICD-10-CM

## 2024-06-30 PROCEDURE — G0383 LEV 4 HOSP TYPE B ED VISIT: HCPCS | Performed by: PHYSICIAN ASSISTANT

## 2024-06-30 RX ORDER — METHYLPREDNISOLONE 4 MG/1
TABLET ORAL
Qty: 21 TABLET | Refills: 0 | Status: SHIPPED | OUTPATIENT
Start: 2024-06-30

## 2024-06-30 NOTE — PATIENT INSTRUCTIONS
Right foot inflammation  Medrol Dosepak as directed  Rest, ice, elevate  Follow up with PCP in 3-5 days.  Proceed to  ER if symptoms worsen.

## 2024-06-30 NOTE — LETTER
June 30, 2024     Patient: Elisa Mcneill   YOB: 2005   Date of Visit: 6/30/2024       To Whom it May Concern:    Elisa Mcneill was seen in my clinic on 6/30/2024. She may return to work on 7/2/2024 .    If you have any questions or concerns, please don't hesitate to call.         Sincerely,          Ashwin Black PA-C        CC: No Recipients

## 2024-06-30 NOTE — PROGRESS NOTES
[facility]        NAME: Elisa Mcneill is a 19 y.o. female  : 2005    MRN: [id number]  DATE: 2024  TIME: 6:49 PM    Assessment and Plan   Inflammation of foot joint [M19.079]  1. Inflammation of foot joint  methylPREDNISolone 4 MG tablet therapy pack            Patient Instructions     Right foot inflammation  Medrol Dosepak as directed  Rest, ice, elevate  Follow up with PCP in 3-5 days.  Proceed to  ER if symptoms worsen.    Chief Complaint     Chief Complaint   Patient presents with    Foot Pain     Patient states she has ongoing right foot pain x2 months; evaluated by pcp and received xrays which were unremarkable; states she was prescribed meloxicam but it is now unrelieved          History of Present Illness       19-year-old female who presents complaining of pain to the right foot x 3 months.  Patient states that she was seen by primary care doctor and had x-rays which were negative.  Denies fevers, chills, chest pain, shortness of breath.  Has been taking meloxicam with no improvement.        Review of Systems   Review of Systems   Constitutional: Negative.    HENT: Negative.     Eyes: Negative.    Respiratory: Negative.  Negative for cough, chest tightness, shortness of breath, wheezing and stridor.    Cardiovascular: Negative.  Negative for chest pain, palpitations and leg swelling.   Musculoskeletal:  Positive for arthralgias.         Current Medications       Current Outpatient Medications:     albuterol (PROVENTIL HFA,VENTOLIN HFA) 90 mcg/act inhaler, Inhale 2 puffs every 6 (six) hours as needed for wheezing, Disp: 8 g, Rfl: 0    ALPRAZolam (XANAX) 0.5 mg tablet, Take 1 tablet (0.5 mg total) by mouth daily at bedtime as needed for anxiety for up to 10 days, Disp: 10 tablet, Rfl: 0    escitalopram (LEXAPRO) 20 mg tablet, Take 1 tablet (20 mg total) by mouth daily, Disp: 90 tablet, Rfl: 3    meloxicam (MOBIC) 7.5 mg tablet, Take 1 tablet (7.5 mg total) by mouth daily as needed  for moderate pain for up to 14 days, Disp: 14 tablet, Rfl: 0    methylPREDNISolone 4 MG tablet therapy pack, Use as directed on package, Disp: 21 tablet, Rfl: 0    ondansetron (ZOFRAN) 4 mg tablet, Take 1 tablet (4 mg total) by mouth every 8 (eight) hours as needed for nausea or vomiting, Disp: 12 tablet, Rfl: 1    traZODone (DESYREL) 50 mg tablet, Take 1 tablet (50 mg total) by mouth daily at bedtime as needed for sleep, Disp: 90 tablet, Rfl: 1    Xulane 150-35 MCG/24HR, APPLY 1 PATCH ONE TIME PER WEEK, Disp: 9 patch, Rfl: 1    metoprolol succinate (TOPROL-XL) 25 mg 24 hr tablet, Take 0.5 tablets (12.5 mg total) by mouth daily (Patient not taking: Reported on 6/30/2024), Disp: 15 tablet, Rfl: 2    Current Allergies     Allergies as of 06/30/2024 - Reviewed 06/30/2024   Allergen Reaction Noted    Buspar [buspirone] Other (See Comments) 06/18/2024    Penicillins Rash 03/06/2016            The following portions of the patient's history were reviewed and updated as appropriate: allergies, current medications, past family history, past medical history, past social history, past surgical history and problem list.     Past Medical History:   Diagnosis Date    Anxiety 3/10/22    Depression     Headache(784.0)     Urinary tract infection        Past Surgical History:   Procedure Laterality Date    CHOLECYSTECTOMY  06-21-23    CHOLECYSTECTOMY LAPAROSCOPIC N/A 06/21/2023    Procedure: CHOLECYSTECTOMY LAPAROSCOPIC: POSSIBLE OPEN;  Surgeon: Kristian Mcmahon MD;  Location[facility];  Service: General    DENTAL SURGERY         Family History   Problem Relation Age of Onset    Heart disease Father     Hypertension Father     Diabetes Father     Coronary artery disease Father     Hyperlipidemia Father     Deep vein thrombosis Father     Heart attack Father     Thyroid disease Father     Ovarian cancer Maternal Grandmother     Diabetes Maternal Grandmother     Cancer Maternal Grandmother     Arthritis Mother     Anxiety  "disorder Mother     No Known Problems Sister     Dementia Maternal Grandfather          Medications have been verified.        Objective   /71   Pulse 95   Temp 98 °F (36.7 °C)   Resp 18   Ht 5' 2\" (1.575 m)   Wt 74.4 kg (164 lb)   SpO2 97%   BMI 30.00 kg/m²        Physical Exam     Physical Exam  Constitutional:       Appearance: She is well-developed.   HENT:      Head: Normocephalic and atraumatic.      Right Ear: External ear normal.      Left Ear: External ear normal.      Mouth/Throat:      Mouth: Oropharynx is clear and moist.   Cardiovascular:      Rate and Rhythm: Normal rate and regular rhythm.      Heart sounds: Normal heart sounds.   Pulmonary:      Effort: Pulmonary effort is normal. No respiratory distress.      Breath sounds: Normal breath sounds. No wheezing or rales.   Chest:      Chest wall: No tenderness.   Musculoskeletal:      Cervical back: Normal range of motion and neck supple.        Legs:    Lymphadenopathy:      Cervical: No cervical adenopathy.                   "

## 2024-07-10 DIAGNOSIS — R00.2 HEART PALPITATIONS: ICD-10-CM

## 2024-07-10 RX ORDER — METOPROLOL SUCCINATE 25 MG/1
12.5 TABLET, EXTENDED RELEASE ORAL DAILY
Qty: 45 TABLET | Refills: 1 | Status: SHIPPED | OUTPATIENT
Start: 2024-07-10

## 2024-08-19 ENCOUNTER — OFFICE VISIT (OUTPATIENT)
Dept: URGENT CARE | Facility: MEDICAL CENTER | Age: 19
End: 2024-08-19
Payer: COMMERCIAL

## 2024-08-19 VITALS
SYSTOLIC BLOOD PRESSURE: 130 MMHG | BODY MASS INDEX: 31.46 KG/M2 | HEART RATE: 106 BPM | DIASTOLIC BLOOD PRESSURE: 76 MMHG | RESPIRATION RATE: 18 BRPM | OXYGEN SATURATION: 96 % | TEMPERATURE: 98.5 F | WEIGHT: 172 LBS

## 2024-08-19 DIAGNOSIS — L03.113 CELLULITIS OF RIGHT UPPER EXTREMITY: Primary | ICD-10-CM

## 2024-08-19 PROCEDURE — G0383 LEV 4 HOSP TYPE B ED VISIT: HCPCS | Performed by: FAMILY MEDICINE

## 2024-08-19 RX ORDER — SULFAMETHOXAZOLE/TRIMETHOPRIM 800-160 MG
1 TABLET ORAL EVERY 12 HOURS SCHEDULED
Qty: 20 TABLET | Refills: 0 | Status: SHIPPED | OUTPATIENT
Start: 2024-08-19 | End: 2024-08-29

## 2024-08-19 RX ORDER — HYDROCORTISONE 2.5 %
CREAM (GRAM) TOPICAL 4 TIMES DAILY PRN
Qty: 20 G | Refills: 0 | Status: SHIPPED | OUTPATIENT
Start: 2024-08-19

## 2024-08-19 NOTE — PROGRESS NOTES
[facility]        NAME: Elisa Mcneill is a 19 y.o. female  : 2005    MRN: [id number]  DATE: 2024  TIME: 7:40 PM    Assessment and Plan   Cellulitis of right upper extremity [L03.113]  1. Cellulitis of right upper extremity  hydrocortisone 2.5 % cream    sulfamethoxazole-trimethoprim (BACTRIM DS) 800-160 mg per tablet    started on keflex due to appearance of lesion and no clear exposure to insects etc.  topical steroid for symptomatic care.            Patient Instructions       Follow up with PCP in 3-5 days.  Proceed to  ER if symptoms worsen.    If tests have been performed at [facility], our office will contact you with results if changes need to be made to the care plan discussed with you at the visit.  You can review your full results on [facility].    Chief Complaint     Chief Complaint   Patient presents with   • Rash     Pt. With a red area to her right upper arm for the past 3 days.          History of Present Illness       Rash  This is a new problem. The current episode started in the past 7 days. The problem has been gradually worsening since onset. The affected locations include the right arm. The problem is mild. The rash is characterized by burning and itchiness. She was exposed to nothing. Associated symptoms include itching. Pertinent negatives include no anorexia, congestion, cough, decreased physical activity, decreased responsiveness, decreased sleep, drinking less, diarrhea, facial edema, fatigue, fever, joint pain, rhinorrhea, shortness of breath, sore throat or vomiting. Treatments tried: clotrimazole. The treatment provided no relief. There is no history of allergies or eczema. There were no sick contacts.       Review of Systems   Review of Systems   Constitutional:  Negative for decreased responsiveness, fatigue and fever.   HENT:  Negative for congestion, rhinorrhea and sore throat.    Respiratory:  Negative for cough and shortness of breath.     Gastrointestinal:  Negative for anorexia, diarrhea and vomiting.   Musculoskeletal:  Negative for joint pain.   Skin:  Positive for itching and rash.         Current Medications       Current Outpatient Medications:   •  albuterol (PROVENTIL HFA,VENTOLIN HFA) 90 mcg/act inhaler, Inhale 2 puffs every 6 (six) hours as needed for wheezing, Disp: 8 g, Rfl: 0  •  ALPRAZolam (XANAX) 0.5 mg tablet, Take 1 tablet (0.5 mg total) by mouth daily at bedtime as needed for anxiety for up to 10 days, Disp: 10 tablet, Rfl: 0  •  escitalopram (LEXAPRO) 20 mg tablet, Take 1 tablet (20 mg total) by mouth daily, Disp: 90 tablet, Rfl: 3  •  hydrocortisone 2.5 % cream, Apply topically 4 (four) times a day as needed for rash, Disp: 20 g, Rfl: 0  •  sulfamethoxazole-trimethoprim (BACTRIM DS) 800-160 mg per tablet, Take 1 tablet by mouth every 12 (twelve) hours for 10 days, Disp: 20 tablet, Rfl: 0  •  traZODone (DESYREL) 50 mg tablet, Take 1 tablet (50 mg total) by mouth daily at bedtime as needed for sleep, Disp: 90 tablet, Rfl: 1  •  Xulane 150-35 MCG/24HR, APPLY 1 PATCH ONE TIME PER WEEK, Disp: 9 patch, Rfl: 1  •  meloxicam (MOBIC) 7.5 mg tablet, Take 1 tablet (7.5 mg total) by mouth daily as needed for moderate pain for up to 14 days (Patient not taking: Reported on 8/19/2024), Disp: 14 tablet, Rfl: 0  •  methylPREDNISolone 4 MG tablet therapy pack, Use as directed on package (Patient not taking: Reported on 8/19/2024), Disp: 21 tablet, Rfl: 0  •  metoprolol succinate (TOPROL-XL) 25 mg 24 hr tablet, TAKE 1/2 TABLET BY MOUTH EVERY DAY (Patient not taking: Reported on 8/19/2024), Disp: 45 tablet, Rfl: 1  •  ondansetron (ZOFRAN) 4 mg tablet, Take 1 tablet (4 mg total) by mouth every 8 (eight) hours as needed for nausea or vomiting (Patient not taking: Reported on 8/19/2024), Disp: 12 tablet, Rfl: 1    Current Allergies     Allergies as of 08/19/2024 - Reviewed 08/19/2024   Allergen Reaction Noted   • Buspar [buspirone] Other (See  Comments) 06/18/2024   • Penicillins Rash 03/06/2016            The following portions of the patient's history were reviewed and updated as appropriate: allergies, current medications, past family history, past medical history, past social history, past surgical history and problem list.     Past Medical History:   Diagnosis Date   • Anxiety 3/10/22   • Depression    • Headache(784.0)    • Urinary tract infection        Past Surgical History:   Procedure Laterality Date   • CHOLECYSTECTOMY  06-21-23   • CHOLECYSTECTOMY LAPAROSCOPIC N/A 06/21/2023    Procedure: CHOLECYSTECTOMY LAPAROSCOPIC: POSSIBLE OPEN;  Surgeon: Kristian Mcmahon MD;  Location: [facility];  Service: General   • DENTAL SURGERY         Family History   Problem Relation Age of Onset   • Heart disease Father    • Hypertension Father    • Diabetes Father    • Coronary artery disease Father    • Hyperlipidemia Father    • Deep vein thrombosis Father    • Heart attack Father    • Thyroid disease Father    • Ovarian cancer Maternal Grandmother    • Diabetes Maternal Grandmother    • Cancer Maternal Grandmother    • Arthritis Mother    • Anxiety disorder Mother    • No Known Problems Sister    • Dementia Maternal Grandfather          Medications have been verified.        Objective   /76   Pulse (!) 106   Temp 98.5 °F (36.9 °C)   Resp 18   Wt 78 kg (172 lb)   SpO2 96%   BMI 31.46 kg/m²   No LMP recorded. (Menstrual status: Birth Control).       Physical Exam     Physical Exam  Skin:     General: Skin is warm.             Comments: Patch on right upper arm, ventral side, about 5cm in diameter, erythematous, warm, indurated, clear borders, blanchable, no fluctuance.

## 2024-10-25 DIAGNOSIS — G47.9 SLEEP DISTURBANCE: ICD-10-CM

## 2024-10-25 RX ORDER — TRAZODONE HYDROCHLORIDE 50 MG/1
50 TABLET, FILM COATED ORAL
Qty: 90 TABLET | Refills: 1 | Status: SHIPPED | OUTPATIENT
Start: 2024-10-25 | End: 2025-04-23

## 2024-11-28 DIAGNOSIS — N92.0 MENORRHAGIA WITH REGULAR CYCLE: ICD-10-CM

## 2024-11-28 DIAGNOSIS — N94.6 DYSMENORRHEA IN ADOLESCENT: ICD-10-CM

## 2024-12-02 ENCOUNTER — TELEPHONE (OUTPATIENT)
Dept: OBGYN CLINIC | Facility: CLINIC | Age: 19
End: 2024-12-02

## 2024-12-03 RX ORDER — NORELGESTROMIN AND ETHINYL ESTRADIOL 150; 35 UG/D; UG/D
PATCH TRANSDERMAL
Qty: 3 PATCH | Refills: 1 | Status: SHIPPED | OUTPATIENT
Start: 2024-12-03

## 2024-12-27 ENCOUNTER — OFFICE VISIT (OUTPATIENT)
Dept: URGENT CARE | Facility: MEDICAL CENTER | Age: 19
End: 2024-12-27
Payer: COMMERCIAL

## 2024-12-27 VITALS
BODY MASS INDEX: 31.37 KG/M2 | RESPIRATION RATE: 16 BRPM | DIASTOLIC BLOOD PRESSURE: 62 MMHG | SYSTOLIC BLOOD PRESSURE: 98 MMHG | TEMPERATURE: 98.1 F | OXYGEN SATURATION: 97 % | HEART RATE: 118 BPM | WEIGHT: 171.5 LBS

## 2024-12-27 DIAGNOSIS — J06.9 VIRAL URI WITH COUGH: Primary | ICD-10-CM

## 2024-12-27 PROCEDURE — G0382 LEV 3 HOSP TYPE B ED VISIT: HCPCS

## 2024-12-27 RX ORDER — FLUTICASONE PROPIONATE 50 MCG
1 SPRAY, SUSPENSION (ML) NASAL DAILY
Qty: 11.1 ML | Refills: 0 | Status: SHIPPED | OUTPATIENT
Start: 2024-12-27

## 2024-12-27 RX ORDER — BENZONATATE 200 MG/1
200 CAPSULE ORAL 3 TIMES DAILY PRN
Qty: 20 CAPSULE | Refills: 0 | Status: SHIPPED | OUTPATIENT
Start: 2024-12-27

## 2024-12-27 NOTE — LETTER
December 27, 2024     Patient: Elisa Mcneill   YOB: 2005   Date of Visit: 12/27/2024       To Whom it May Concern:    Elisa Mcneill was seen in my clinic on 12/27/2024. Please excuse her from work on 12/27/2024.    If you have any questions or concerns, please don't hesitate to call.         Sincerely,          MARQUES Cooper        CC: No Recipients

## 2024-12-27 NOTE — PROGRESS NOTES
[facility]        NAME: Elisa Mcneill is a 19 y.o. female  : 2005    MRN: [id number]  DATE: 2024  TIME: 9:43 AM    Assessment and Plan   Viral URI with cough [J06.9]  1. Viral URI with cough  benzonatate (TESSALON) 200 MG capsule    fluticasone (FLONASE) 50 mcg/act nasal spray            Patient Instructions     Please trial Tessalon every 8 hours as needed for cough.   Please trial warm salt water gargles, Chloraseptic spray, Cepacol cough drops and warm tea with honey as needed for sore throat.  Continue with OTC cough and cold medication.   Drink plenty of fluids.   Follow up with PCP in 3-5 days.  Proceed to  ER if symptoms worsen.    If tests are performed, our office will contact you with results only if changes need to made to the care plan discussed with you at the visit. You can review your full results on [facility].    Chief Complaint     Chief Complaint   Patient presents with    Cold Like Symptoms     Vomiting mucous today, coughing. Sore throat. No fever or chills. Right ear pain. Some difficulty breathing. Using inhaler and delsym. Sx 3 days. Exposed to someone at home with similar sx.          History of Present Illness       19-year-old female presents for evaluation of upper respiratory symptoms.  Over the past 3 days patient has been experiencing congestion, postnasal drip, sore throat and cough.  She took a dose of Delsym with minimal relief.  She denies any known sick exposures.        Review of Systems   Review of Systems   Constitutional:  Negative for chills and fever.   HENT:  Positive for congestion, postnasal drip and sore throat.    Respiratory:  Positive for cough. Negative for shortness of breath.    Cardiovascular:  Negative for chest pain.   Gastrointestinal:  Negative for diarrhea, nausea and vomiting.   All other systems reviewed and are negative.        Current Medications       Current Outpatient Medications:     albuterol (PROVENTIL  HFA,VENTOLIN HFA) 90 mcg/act inhaler, Inhale 2 puffs every 6 (six) hours as needed for wheezing, Disp: 8 g, Rfl: 0    ALPRAZolam (XANAX) 0.5 mg tablet, Take 1 tablet (0.5 mg total) by mouth daily at bedtime as needed for anxiety for up to 10 days, Disp: 10 tablet, Rfl: 0    benzonatate (TESSALON) 200 MG capsule, Take 1 capsule (200 mg total) by mouth 3 (three) times a day as needed for cough, Disp: 20 capsule, Rfl: 0    escitalopram (LEXAPRO) 20 mg tablet, Take 1 tablet (20 mg total) by mouth daily, Disp: 90 tablet, Rfl: 3    fluticasone (FLONASE) 50 mcg/act nasal spray, 1 spray into each nostril daily, Disp: 11.1 mL, Rfl: 0    hydrocortisone 2.5 % cream, Apply topically 4 (four) times a day as needed for rash, Disp: 20 g, Rfl: 0    norelgestromin-ethinyl estradiol (Xulane) 150-35 MCG/24HR, APPLY 1 PATCH ONE TIME PER WEEK, Disp: 3 patch, Rfl: 1    traZODone (DESYREL) 50 mg tablet, Take 1 tablet (50 mg total) by mouth daily at bedtime as needed for sleep, Disp: 90 tablet, Rfl: 1    meloxicam (MOBIC) 7.5 mg tablet, Take 1 tablet (7.5 mg total) by mouth daily as needed for moderate pain for up to 14 days (Patient not taking: Reported on 8/19/2024), Disp: 14 tablet, Rfl: 0    methylPREDNISolone 4 MG tablet therapy pack, Use as directed on package (Patient not taking: Reported on 8/19/2024), Disp: 21 tablet, Rfl: 0    metoprolol succinate (TOPROL-XL) 25 mg 24 hr tablet, TAKE 1/2 TABLET BY MOUTH EVERY DAY (Patient not taking: Reported on 8/19/2024), Disp: 45 tablet, Rfl: 1    ondansetron (ZOFRAN) 4 mg tablet, Take 1 tablet (4 mg total) by mouth every 8 (eight) hours as needed for nausea or vomiting (Patient not taking: Reported on 8/19/2024), Disp: 12 tablet, Rfl: 1    Current Allergies     Allergies as of 12/27/2024 - Reviewed 12/27/2024   Allergen Reaction Noted    Buspar [buspirone] Other (See Comments) 06/18/2024    Penicillins Rash 03/06/2016            The following portions of the patient's history were reviewed  and updated as appropriate: allergies, current medications, past family history, past medical history, past social history, past surgical history and problem list.     Past Medical History:   Diagnosis Date    Anxiety 3/10/22    Depression     Headache(784.0)     Urinary tract infection        Past Surgical History:   Procedure Laterality Date    CHOLECYSTECTOMY  06-21-23    CHOLECYSTECTOMY LAPAROSCOPIC N/A 06/21/2023    Procedure: CHOLECYSTECTOMY LAPAROSCOPIC: POSSIBLE OPEN;  Surgeon: Kristian Mcmahon MD;  Location: [facility];  Service: General    DENTAL SURGERY         Family History   Problem Relation Age of Onset    Heart disease Father     Hypertension Father     Diabetes Father     Coronary artery disease Father     Hyperlipidemia Father     Deep vein thrombosis Father     Heart attack Father     Thyroid disease Father     Ovarian cancer Maternal Grandmother     Diabetes Maternal Grandmother     Cancer Maternal Grandmother     Arthritis Mother     Anxiety disorder Mother     No Known Problems Sister     Dementia Maternal Grandfather          Medications have been verified.        Objective   BP 98/62   Pulse (!) 118   Temp 98.1 °F (36.7 °C)   Resp 16   Wt 77.8 kg (171 lb 8 oz)   SpO2 97%   BMI 31.37 kg/m²        Physical Exam     Physical Exam  Vitals and nursing note reviewed.   Constitutional:       General: She is not in acute distress.     Appearance: Normal appearance. She is normal weight. She is not ill-appearing, toxic-appearing or diaphoretic.   HENT:      Head: Normocephalic and atraumatic.      Right Ear: Tympanic membrane normal.      Left Ear: Tympanic membrane normal.      Nose: Congestion present. No rhinorrhea.      Mouth/Throat:      Mouth: Mucous membranes are moist.      Pharynx: Oropharynx is clear. Posterior oropharyngeal erythema and postnasal drip present. No oropharyngeal exudate.   Eyes:      General:         Right eye: No discharge.         Left eye: No discharge.    Cardiovascular:      Rate and Rhythm: Normal rate and regular rhythm.      Pulses: Normal pulses.      Heart sounds: Normal heart sounds. No murmur heard.     No friction rub. No gallop.   Pulmonary:      Effort: Pulmonary effort is normal. No respiratory distress.      Breath sounds: Normal breath sounds. No stridor. No wheezing, rhonchi or rales.   Chest:      Chest wall: No tenderness.   Abdominal:      General: Bowel sounds are normal.      Palpations: Abdomen is soft.      Tenderness: There is no abdominal tenderness.   Skin:     General: Skin is warm and dry.   Neurological:      Mental Status: She is alert.   Psychiatric:         Mood and Affect: Mood normal.         Behavior: Behavior normal.

## 2025-01-03 ENCOUNTER — APPOINTMENT (OUTPATIENT)
Dept: URGENT CARE | Facility: MEDICAL CENTER | Age: 20
End: 2025-01-03
Payer: COMMERCIAL

## 2025-01-03 ENCOUNTER — RESULTS FOLLOW-UP (OUTPATIENT)
Dept: URGENT CARE | Facility: MEDICAL CENTER | Age: 20
End: 2025-01-03

## 2025-01-03 ENCOUNTER — OFFICE VISIT (OUTPATIENT)
Dept: URGENT CARE | Facility: MEDICAL CENTER | Age: 20
End: 2025-01-03
Payer: COMMERCIAL

## 2025-01-03 ENCOUNTER — APPOINTMENT (OUTPATIENT)
Dept: RADIOLOGY | Facility: MEDICAL CENTER | Age: 20
End: 2025-01-03
Payer: COMMERCIAL

## 2025-01-03 VITALS
OXYGEN SATURATION: 97 % | WEIGHT: 173 LBS | DIASTOLIC BLOOD PRESSURE: 82 MMHG | BODY MASS INDEX: 31.64 KG/M2 | HEART RATE: 120 BPM | TEMPERATURE: 97.9 F | RESPIRATION RATE: 16 BRPM | SYSTOLIC BLOOD PRESSURE: 114 MMHG

## 2025-01-03 DIAGNOSIS — R05.2 SUBACUTE COUGH: Primary | ICD-10-CM

## 2025-01-03 DIAGNOSIS — R05.2 SUBACUTE COUGH: ICD-10-CM

## 2025-01-03 PROCEDURE — G0382 LEV 3 HOSP TYPE B ED VISIT: HCPCS

## 2025-01-03 PROCEDURE — 71046 X-RAY EXAM CHEST 2 VIEWS: CPT

## 2025-01-03 RX ORDER — PREDNISONE 20 MG/1
20 TABLET ORAL 2 TIMES DAILY WITH MEALS
Qty: 10 TABLET | Refills: 0 | Status: SHIPPED | OUTPATIENT
Start: 2025-01-03 | End: 2025-01-08

## 2025-01-03 RX ORDER — BENZONATATE 200 MG/1
200 CAPSULE ORAL 3 TIMES DAILY PRN
Qty: 20 CAPSULE | Refills: 0 | Status: SHIPPED | OUTPATIENT
Start: 2025-01-03

## 2025-01-03 NOTE — PATIENT INSTRUCTIONS
Please trial Tessalon every 8 hours as needed for cough.   Please take Prednisone 2x daily with meals for 5 days.    Continue with OTC cough and cold medication.   Drink plenty of fluids.   Follow up with PCP in 3-5 days.  Proceed to  ER if symptoms worsen.    If tests are performed, our office will contact you with results only if changes need to made to the care plan discussed with you at the visit. You can review your full results on [facility].

## 2025-01-03 NOTE — PROGRESS NOTES
[facility]        NAME: Elisa Mcneill is a 19 y.o. female  : 2005    MRN: [id number]  DATE: January 3, 2025  TIME: 6:18 PM    Assessment and Plan   Subacute cough [R05.2]  1. Subacute cough  XR chest pa and lateral    predniSONE 20 mg tablet    benzonatate (TESSALON) 200 MG capsule        Chest x-ray reviewed, no acute cardiopulmonary disease identified, pending radiology review.    Patient Instructions     Please trial Tessalon every 8 hours as needed for cough.   Please take Prednisone 2x daily with meals for 5 days.    Continue with OTC cough and cold medication.   Drink plenty of fluids.   Follow up with PCP in 3-5 days.  Proceed to  ER if symptoms worsen.    If tests are performed, our office will contact you with results only if changes need to made to the care plan discussed with you at the visit. You can review your full results on [facility].    Chief Complaint     Chief Complaint   Patient presents with    Chest Pain     Unable to take deep breaths without feeling sharp rib pain. Had flu last week. Some coughing. Runny nose. No fever or chills. Taking tessalon perles. SOB on exertion. Some wheezing at night.          History of Present Illness       19-year-old female presents for evaluation of cough.  Patient states over the past 2 days she has been experiencing cough.  She notes that the cough is sometimes dry and sometimes productive.  She states that she is now experiencing rib pain and mild shortness of breath over the past 4 days.  She has been using albuterol and Tessalon with mild relief of her symptoms.    Chest Pain   Associated symptoms include a cough and shortness of breath. Pertinent negatives include no fever, nausea or vomiting.       Review of Systems   Review of Systems   Constitutional:  Negative for chills and fever.   Respiratory:  Positive for cough and shortness of breath. Negative for chest tightness.    Cardiovascular:  Negative for chest pain.    Gastrointestinal:  Negative for diarrhea, nausea and vomiting.   All other systems reviewed and are negative.        Current Medications       Current Outpatient Medications:     albuterol (PROVENTIL HFA,VENTOLIN HFA) 90 mcg/act inhaler, Inhale 2 puffs every 6 (six) hours as needed for wheezing, Disp: 8 g, Rfl: 0    ALPRAZolam (XANAX) 0.5 mg tablet, Take 1 tablet (0.5 mg total) by mouth daily at bedtime as needed for anxiety for up to 10 days, Disp: 10 tablet, Rfl: 0    benzonatate (TESSALON) 200 MG capsule, Take 1 capsule (200 mg total) by mouth 3 (three) times a day as needed for cough, Disp: 20 capsule, Rfl: 0    escitalopram (LEXAPRO) 20 mg tablet, Take 1 tablet (20 mg total) by mouth daily, Disp: 90 tablet, Rfl: 3    fluticasone (FLONASE) 50 mcg/act nasal spray, 1 spray into each nostril daily, Disp: 11.1 mL, Rfl: 0    hydrocortisone 2.5 % cream, Apply topically 4 (four) times a day as needed for rash, Disp: 20 g, Rfl: 0    norelgestromin-ethinyl estradiol (Xulane) 150-35 MCG/24HR, APPLY 1 PATCH ONE TIME PER WEEK, Disp: 3 patch, Rfl: 1    predniSONE 20 mg tablet, Take 1 tablet (20 mg total) by mouth 2 (two) times a day with meals for 5 days, Disp: 10 tablet, Rfl: 0    traZODone (DESYREL) 50 mg tablet, Take 1 tablet (50 mg total) by mouth daily at bedtime as needed for sleep, Disp: 90 tablet, Rfl: 1    benzonatate (TESSALON) 200 MG capsule, Take 1 capsule (200 mg total) by mouth 3 (three) times a day as needed for cough (Patient not taking: Reported on 1/3/2025), Disp: 20 capsule, Rfl: 0    meloxicam (MOBIC) 7.5 mg tablet, Take 1 tablet (7.5 mg total) by mouth daily as needed for moderate pain for up to 14 days (Patient not taking: Reported on 8/19/2024), Disp: 14 tablet, Rfl: 0    methylPREDNISolone 4 MG tablet therapy pack, Use as directed on package (Patient not taking: Reported on 8/19/2024), Disp: 21 tablet, Rfl: 0    metoprolol succinate (TOPROL-XL) 25 mg 24 hr tablet, TAKE 1/2 TABLET BY MOUTH EVERY DAY  (Patient not taking: Reported on 8/19/2024), Disp: 45 tablet, Rfl: 1    ondansetron (ZOFRAN) 4 mg tablet, Take 1 tablet (4 mg total) by mouth every 8 (eight) hours as needed for nausea or vomiting (Patient not taking: Reported on 8/19/2024), Disp: 12 tablet, Rfl: 1    Current Allergies     Allergies as of 01/03/2025 - Reviewed 01/03/2025   Allergen Reaction Noted    Buspar [buspirone] Other (See Comments) 06/18/2024    Penicillins Rash 03/06/2016            The following portions of the patient's history were reviewed and updated as appropriate: allergies, current medications, past family history, past medical history, past social history, past surgical history and problem list.     Past Medical History:   Diagnosis Date    Anxiety 3/10/22    Depression     Headache(784.0)     Urinary tract infection        Past Surgical History:   Procedure Laterality Date    CHOLECYSTECTOMY  06-21-23    CHOLECYSTECTOMY LAPAROSCOPIC N/A 06/21/2023    Procedure: CHOLECYSTECTOMY LAPAROSCOPIC: POSSIBLE OPEN;  Surgeon: Kristian Mcmahon MD;  [facility];  Service: General    DENTAL SURGERY         Family History   Problem Relation Age of Onset    Heart disease Father     Hypertension Father     Diabetes Father     Coronary artery disease Father     Hyperlipidemia Father     Deep vein thrombosis Father     Heart attack Father     Thyroid disease Father     Ovarian cancer Maternal Grandmother     Diabetes Maternal Grandmother     Cancer Maternal Grandmother     Arthritis Mother     Anxiety disorder Mother     No Known Problems Sister     Dementia Maternal Grandfather          Medications have been verified.        Objective   /82   Pulse (!) 120   Temp 97.9 °F (36.6 °C)   Resp 16   Wt 78.5 kg (173 lb)   SpO2 97%   BMI 31.64 kg/m²        Physical Exam     Physical Exam  Vitals and nursing note reviewed.   Constitutional:       General: She is not in acute distress.     Appearance: Normal appearance. She is normal  weight. She is not ill-appearing, toxic-appearing or diaphoretic.   HENT:      Head: Normocephalic and atraumatic.      Nose: Nose normal. No congestion or rhinorrhea.      Mouth/Throat:      Mouth: Mucous membranes are moist.   Eyes:      General:         Right eye: No discharge.         Left eye: No discharge.   Cardiovascular:      Rate and Rhythm: Normal rate and regular rhythm.      Pulses: Normal pulses.      Heart sounds: Normal heart sounds. No murmur heard.     No friction rub. No gallop.   Pulmonary:      Effort: Pulmonary effort is normal. No respiratory distress.      Breath sounds: Normal breath sounds. No stridor. No wheezing, rhonchi or rales.   Chest:      Chest wall: No tenderness.   Abdominal:      General: Bowel sounds are normal.      Palpations: Abdomen is soft.      Tenderness: There is no abdominal tenderness.   Skin:     General: Skin is warm and dry.   Neurological:      Mental Status: She is alert.   Psychiatric:         Mood and Affect: Mood normal.         Behavior: Behavior normal.

## 2025-01-12 DIAGNOSIS — N92.0 MENORRHAGIA WITH REGULAR CYCLE: ICD-10-CM

## 2025-01-12 DIAGNOSIS — N94.6 DYSMENORRHEA IN ADOLESCENT: ICD-10-CM

## 2025-01-17 ENCOUNTER — TELEPHONE (OUTPATIENT)
Dept: OBGYN CLINIC | Facility: CLINIC | Age: 20
End: 2025-01-17

## 2025-01-17 NOTE — TELEPHONE ENCOUNTER
2nd attempt to call pt lmom for pt to return our call to schedule annual exam so we can send medication refill.

## 2025-01-20 ENCOUNTER — TELEPHONE (OUTPATIENT)
Dept: OBGYN CLINIC | Facility: CLINIC | Age: 20
End: 2025-01-20

## 2025-01-20 RX ORDER — NORELGESTROMIN AND ETHINYL ESTRADIOL 35; 150 UG/MG; UG/MG
PATCH TRANSDERMAL
Qty: 3 PATCH | Refills: 1 | OUTPATIENT
Start: 2025-01-20

## 2025-03-14 ENCOUNTER — OFFICE VISIT (OUTPATIENT)
Dept: FAMILY MEDICINE CLINIC | Facility: CLINIC | Age: 20
End: 2025-03-14
Payer: COMMERCIAL

## 2025-03-14 VITALS
BODY MASS INDEX: 32.76 KG/M2 | SYSTOLIC BLOOD PRESSURE: 118 MMHG | OXYGEN SATURATION: 100 % | RESPIRATION RATE: 18 BRPM | TEMPERATURE: 97.7 F | HEIGHT: 62 IN | WEIGHT: 178 LBS | HEART RATE: 83 BPM | DIASTOLIC BLOOD PRESSURE: 74 MMHG

## 2025-03-14 DIAGNOSIS — G47.9 SLEEP DISTURBANCE: ICD-10-CM

## 2025-03-14 DIAGNOSIS — F50.811 MODERATE BINGE-EATING DISORDER: ICD-10-CM

## 2025-03-14 DIAGNOSIS — F41.1 GENERALIZED ANXIETY DISORDER: ICD-10-CM

## 2025-03-14 DIAGNOSIS — J45.20 MILD INTERMITTENT ASTHMA, UNSPECIFIED WHETHER COMPLICATED: ICD-10-CM

## 2025-03-14 DIAGNOSIS — Z00.00 ANNUAL PHYSICAL EXAM: Primary | ICD-10-CM

## 2025-03-14 PROCEDURE — 99214 OFFICE O/P EST MOD 30 MIN: CPT | Performed by: FAMILY MEDICINE

## 2025-03-14 PROCEDURE — 99395 PREV VISIT EST AGE 18-39: CPT | Performed by: FAMILY MEDICINE

## 2025-03-14 RX ORDER — LISDEXAMFETAMINE DIMESYLATE 20 MG/1
20 CAPSULE ORAL EVERY MORNING
Qty: 30 CAPSULE | Refills: 0 | Status: SHIPPED | OUTPATIENT
Start: 2025-03-14

## 2025-03-14 RX ORDER — TRAZODONE HYDROCHLORIDE 100 MG/1
100 TABLET ORAL
Qty: 90 TABLET | Refills: 1 | Status: SHIPPED | OUTPATIENT
Start: 2025-03-14 | End: 2025-09-10

## 2025-03-14 NOTE — ASSESSMENT & PLAN NOTE
Continues to suffer with insomnia.  Difficulty falling asleep.  Initially responded well to trazodone.  Increase to 100 mg daily monitor response.  Continue good sleep hygiene.  Orders:    traZODone (DESYREL) 100 mg tablet; Take 1 tablet (100 mg total) by mouth daily at bedtime as needed for sleep

## 2025-03-14 NOTE — ASSESSMENT & PLAN NOTE
Remains on Lexapro 20 mg.  Has experienced some weight gain with Lexapro  Failed Prozac  Not currently following with a therapist.    Patient would be a good candidate for [facility] testing.  She has been given multiple different benzodiazepines for situational anxiety.  Recently given 5 mg of diazepam from her dentist but had no effect.

## 2025-03-14 NOTE — PROGRESS NOTES
Adult Annual Physical  Name: Elisa Mcneill      : 2005      MRN: [id number]  Encounter Provider: Kings Laboy MD  Encounter Date: 3/14/2025   Encounter department: [facility]    Assessment & Plan  Annual physical exam         Moderate binge-eating disorder  Start Vyvanse 20 mg daily for binge eating.  Continue Lexapro.  Recommend establishing with psychologist/therapist however this appears to be because prohibitive.  Could also consider starting a GLP-1 for obesity.  She is going to reach out to her insurance.  Mood is currently stable.  If Vyvanse is unaffordable could switch to Topamax  Orders:    lisdexamfetamine (VYVANSE) 20 MG capsule; Take 1 capsule (20 mg total) by mouth every morning Max Daily Amount: 20 mg    Mild intermittent asthma, unspecified whether complicated         Sleep disturbance  Continues to suffer with insomnia.  Difficulty falling asleep.  Initially responded well to trazodone.  Increase to 100 mg daily monitor response.  Continue good sleep hygiene.  Orders:    traZODone (DESYREL) 100 mg tablet; Take 1 tablet (100 mg total) by mouth daily at bedtime as needed for sleep    Generalized anxiety disorder  Remains on Lexapro 20 mg.  Has experienced some weight gain with Lexapro  Failed Prozac  Not currently following with a therapist.    Patient would be a good candidate for Gene[employer] testing.  She has been given multiple different benzodiazepines for situational anxiety.  Recently given 5 mg of diazepam from her dentist but had no effect.           Preventive Screenings:    - Prostate cancer screening: screening not indicated     Immunizations:  - Immunizations due: Prevnar 20 and HPV (Gardasil 9)         History of Present Illness     Adult Annual Physical:  Patient presents for annual physical.     Diet and Physical Activity:  - Diet/Nutrition: well balanced diet. binge eating  - Exercise: walking and 1-2 times a week on average.    Depression Screening:  -  PHQ-2 Score: 0    General Health:  - Sleep: sleeps well. Trouble falling asleep.  When she is asleep she is able to stay asleep.  Trazodone not working as quickly as previous.  - Hearing: normal hearing bilateral ears.  - Vision: no vision problems.  - Dental: regular dental visits.    /GYN Health:  - Follows with GYN: yes.   - Contraception:. Upcoming appointment with OB/GYN      Review of Systems   Constitutional:  Positive for fatigue and unexpected weight change. Negative for activity change and fever.   Eyes:  Negative for visual disturbance.   Respiratory:  Negative for shortness of breath.    Cardiovascular:  Negative for chest pain.   Gastrointestinal:  Negative for abdominal pain, constipation, diarrhea and nausea.   Endocrine: Negative for cold intolerance and heat intolerance.   Musculoskeletal:  Negative for back pain.   Skin:  Negative for rash.   Neurological:  Negative for headaches.   Psychiatric/Behavioral:  Negative for confusion and dysphoric mood. The patient is nervous/anxious.         Binge eating     Medical History Reviewed by provider this encounter:     .  Current Outpatient Medications on File Prior to Visit   Medication Sig Dispense Refill    albuterol (PROVENTIL HFA,VENTOLIN HFA) 90 mcg/act inhaler Inhale 2 puffs every 6 (six) hours as needed for wheezing 8 g 0    ALPRAZolam (XANAX) 0.5 mg tablet Take 1 tablet (0.5 mg total) by mouth daily at bedtime as needed for anxiety for up to 10 days 10 tablet 0    escitalopram (LEXAPRO) 20 mg tablet Take 1 tablet (20 mg total) by mouth daily 90 tablet 3    hydrocortisone 2.5 % cream Apply topically 4 (four) times a day as needed for rash 20 g 0    norelgestromin-ethinyl estradiol (Xulane) 150-35 MCG/24HR APPLY 1 PATCH ONE TIME PER WEEK 3 patch 1    [DISCONTINUED] traZODone (DESYREL) 50 mg tablet Take 1 tablet (50 mg total) by mouth daily at bedtime as needed for sleep 90 tablet 1    [DISCONTINUED] benzonatate (TESSALON) 200 MG capsule Take 1  "capsule (200 mg total) by mouth 3 (three) times a day as needed for cough (Patient not taking: Reported on 1/3/2025) 20 capsule 0    [DISCONTINUED] benzonatate (TESSALON) 200 MG capsule Take 1 capsule (200 mg total) by mouth 3 (three) times a day as needed for cough 20 capsule 0    [DISCONTINUED] fluticasone (FLONASE) 50 mcg/act nasal spray 1 spray into each nostril daily 11.1 mL 0    [DISCONTINUED] meloxicam (MOBIC) 7.5 mg tablet Take 1 tablet (7.5 mg total) by mouth daily as needed for moderate pain for up to 14 days (Patient not taking: Reported on 8/19/2024) 14 tablet 0    [DISCONTINUED] methylPREDNISolone 4 MG tablet therapy pack Use as directed on package (Patient not taking: Reported on 8/19/2024) 21 tablet 0    [DISCONTINUED] metoprolol succinate (TOPROL-XL) 25 mg 24 hr tablet TAKE 1/2 TABLET BY MOUTH EVERY DAY (Patient not taking: Reported on 8/19/2024) 45 tablet 1    [DISCONTINUED] ondansetron (ZOFRAN) 4 mg tablet Take 1 tablet (4 mg total) by mouth every 8 (eight) hours as needed for nausea or vomiting (Patient not taking: Reported on 8/19/2024) 12 tablet 1     No current facility-administered medications on file prior to visit.      Social History     Tobacco Use    Smoking status: Never     Passive exposure: Never    Smokeless tobacco: Never   Vaping Use    Vaping status: Every Day    Substances: Nicotine, Flavoring   Substance and Sexual Activity    Alcohol use: No    Drug use: No    Sexual activity: Yes     Partners: Male     Birth control/protection: OCP     Comment: declines STD /HIV testing        Objective   /74 (BP Location: Left arm, Patient Position: Sitting, Cuff Size: Standard)   Pulse 83   Temp 97.7 °F (36.5 °C) (Temporal)   Resp 18   Ht 5' 2\" (1.575 m)   Wt 80.7 kg (178 lb)   LMP 02/15/2025   SpO2 100%   BMI 32.56 kg/m²     Physical Exam  Vitals and nursing note reviewed.   Constitutional:       Appearance: Normal appearance. She is well-developed. She is obese.   HENT:      " Head: Normocephalic and atraumatic.   Cardiovascular:      Rate and Rhythm: Normal rate and regular rhythm.   Pulmonary:      Effort: Pulmonary effort is normal.      Breath sounds: Normal breath sounds.   Abdominal:      General: Bowel sounds are normal.      Palpations: Abdomen is soft.   Musculoskeletal:      Cervical back: Normal range of motion.   Skin:     General: Skin is warm.   Neurological:      General: No focal deficit present.      Mental Status: She is alert.   Psychiatric:         Mood and Affect: Mood normal.         Speech: Speech normal.         Answers submitted by the patient for this visit:  Annual Physical (Submitted on 3/11/2025)  Diet/Nutrition choices: poor diet, frequent junk food  Diet/Nutrition additional comments: Binge eating  Exercise choices: walking  Sleep choices: sleeps poorly, 4-6 hours of sleep on average  Vision choices: no vision problems  Dental choices: brushes teeth once daily, does not floss  Last menstrual cycle (if applicable):: 2/15/2025  Any history of sexual transmitted disease/infection?: No  Contraception: oral contraceptives  Do you have a durable power of  (POA)?: No

## 2025-03-31 ENCOUNTER — NURSE TRIAGE (OUTPATIENT)
Age: 20
End: 2025-03-31

## 2025-03-31 ENCOUNTER — HOSPITAL ENCOUNTER (EMERGENCY)
Facility: HOSPITAL | Age: 20
Discharge: HOME | End: 2025-03-31
Attending: EMERGENCY MEDICINE
Payer: COMMERCIAL

## 2025-03-31 ENCOUNTER — APPOINTMENT (EMERGENCY)
Dept: RADIOLOGY | Facility: HOSPITAL | Age: 20
End: 2025-03-31
Payer: COMMERCIAL

## 2025-03-31 VITALS
SYSTOLIC BLOOD PRESSURE: 142 MMHG | RESPIRATION RATE: 18 BRPM | OXYGEN SATURATION: 100 % | HEART RATE: 98 BPM | TEMPERATURE: 97.7 F | DIASTOLIC BLOOD PRESSURE: 89 MMHG

## 2025-03-31 DIAGNOSIS — R00.0 TACHYCARDIA: Primary | ICD-10-CM

## 2025-03-31 LAB
ALBUMIN SERPL BCG-MCNC: 4.4 G/DL (ref 3.5–5)
ALP SERPL-CCNC: 95 U/L (ref 34–104)
ALT SERPL W P-5'-P-CCNC: 32 U/L (ref 7–52)
ANION GAP SERPL CALCULATED.3IONS-SCNC: 11 MMOL/L (ref 4–13)
AST SERPL W P-5'-P-CCNC: 43 U/L (ref 13–39)
ATRIAL RATE: 104 BPM
BASOPHILS # BLD AUTO: 0.03 THOUSANDS/ÂΜL (ref 0–0.1)
BASOPHILS NFR BLD AUTO: 0 % (ref 0–1)
BILIRUB SERPL-MCNC: 0.47 MG/DL (ref 0.2–1)
BUN SERPL-MCNC: 4 MG/DL (ref 5–25)
CALCIUM SERPL-MCNC: 9.5 MG/DL (ref 8.4–10.2)
CARDIAC TROPONIN I PNL SERPL HS: <2 NG/L (ref ?–50)
CHLORIDE SERPL-SCNC: 102 MMOL/L (ref 96–108)
CO2 SERPL-SCNC: 23 MMOL/L (ref 21–32)
CREAT SERPL-MCNC: 0.76 MG/DL (ref 0.6–1.3)
D DIMER PPP FEU-MCNC: 0.34 UG/ML FEU
EOSINOPHIL # BLD AUTO: 0.04 THOUSAND/ÂΜL (ref 0–0.61)
EOSINOPHIL NFR BLD AUTO: 0 % (ref 0–6)
ERYTHROCYTE [DISTWIDTH] IN BLOOD BY AUTOMATED COUNT: 12.9 % (ref 11.6–15.1)
EXT PREGNANCY TEST URINE: NEGATIVE
EXT. CONTROL: NORMAL
GFR SERPL CREATININE-BSD FRML MDRD: 111 ML/MIN/1.73SQ M
GLUCOSE SERPL-MCNC: 84 MG/DL (ref 65–140)
HCT VFR BLD AUTO: 39.5 % (ref 34.8–46.1)
HGB BLD-MCNC: 12.7 G/DL (ref 11.5–15.4)
IMM GRANULOCYTES # BLD AUTO: 0.04 THOUSAND/UL (ref 0–0.2)
IMM GRANULOCYTES NFR BLD AUTO: 0 % (ref 0–2)
LYMPHOCYTES # BLD AUTO: 3.67 THOUSANDS/ÂΜL (ref 0.6–4.47)
LYMPHOCYTES NFR BLD AUTO: 29 % (ref 14–44)
MAGNESIUM SERPL-MCNC: 1.8 MG/DL (ref 1.9–2.7)
MCH RBC QN AUTO: 27.2 PG (ref 26.8–34.3)
MCHC RBC AUTO-ENTMCNC: 32.2 G/DL (ref 31.4–37.4)
MCV RBC AUTO: 85 FL (ref 82–98)
MONOCYTES # BLD AUTO: 0.74 THOUSAND/ÂΜL (ref 0.17–1.22)
MONOCYTES NFR BLD AUTO: 6 % (ref 4–12)
NEUTROPHILS # BLD AUTO: 8.26 THOUSANDS/ÂΜL (ref 1.85–7.62)
NEUTS SEG NFR BLD AUTO: 65 % (ref 43–75)
NRBC BLD AUTO-RTO: 0 /100 WBCS
P AXIS: 75 DEGREES
PLATELET # BLD AUTO: 412 THOUSANDS/UL (ref 149–390)
PMV BLD AUTO: 9.7 FL (ref 8.9–12.7)
POTASSIUM SERPL-SCNC: 3.4 MMOL/L (ref 3.5–5.3)
PR INTERVAL: 134 MS
PROT SERPL-MCNC: 7.5 G/DL (ref 6.4–8.4)
QRS AXIS: 92 DEGREES
QRSD INTERVAL: 74 MS
QT INTERVAL: 336 MS
QTC INTERVAL: 441 MS
RBC # BLD AUTO: 4.67 MILLION/UL (ref 3.81–5.12)
SODIUM SERPL-SCNC: 136 MMOL/L (ref 135–147)
T WAVE AXIS: 44 DEGREES
TSH SERPL DL<=0.05 MIU/L-ACNC: 1.37 UIU/ML (ref 0.45–4.5)
VENTRICULAR RATE: 104 BPM
WBC # BLD AUTO: 12.78 THOUSAND/UL (ref 4.31–10.16)

## 2025-03-31 PROCEDURE — 84443 ASSAY THYROID STIM HORMONE: CPT

## 2025-03-31 PROCEDURE — 93005 ELECTROCARDIOGRAM TRACING: CPT

## 2025-03-31 PROCEDURE — 83735 ASSAY OF MAGNESIUM: CPT

## 2025-03-31 PROCEDURE — 84484 ASSAY OF TROPONIN QUANT: CPT

## 2025-03-31 PROCEDURE — 85025 COMPLETE CBC W/AUTO DIFF WBC: CPT

## 2025-03-31 PROCEDURE — 93010 ELECTROCARDIOGRAM REPORT: CPT | Performed by: STUDENT IN AN ORGANIZED HEALTH CARE EDUCATION/TRAINING PROGRAM

## 2025-03-31 PROCEDURE — 71045 X-RAY EXAM CHEST 1 VIEW: CPT

## 2025-03-31 PROCEDURE — 99285 EMERGENCY DEPT VISIT HI MDM: CPT

## 2025-03-31 PROCEDURE — 85379 FIBRIN DEGRADATION QUANT: CPT

## 2025-03-31 PROCEDURE — 81025 URINE PREGNANCY TEST: CPT

## 2025-03-31 PROCEDURE — 80053 COMPREHEN METABOLIC PANEL: CPT

## 2025-03-31 PROCEDURE — 36415 COLL VENOUS BLD VENIPUNCTURE: CPT

## 2025-03-31 PROCEDURE — 99285 EMERGENCY DEPT VISIT HI MDM: CPT | Performed by: EMERGENCY MEDICINE

## 2025-03-31 RX ORDER — LANOLIN ALCOHOL/MO/W.PET/CERES
400 CREAM (GRAM) TOPICAL 2 TIMES DAILY
Status: DISCONTINUED | OUTPATIENT
Start: 2025-03-31 | End: 2025-03-31 | Stop reason: HOSPADM

## 2025-03-31 RX ORDER — POTASSIUM CHLORIDE 1500 MG/1
20 TABLET, EXTENDED RELEASE ORAL ONCE
Status: COMPLETED | OUTPATIENT
Start: 2025-03-31 | End: 2025-03-31

## 2025-03-31 RX ADMIN — POTASSIUM CHLORIDE 20 MEQ: 1500 TABLET, EXTENDED RELEASE ORAL at 19:01

## 2025-03-31 RX ADMIN — Medication 400 MG: at 19:01

## 2025-03-31 NOTE — TELEPHONE ENCOUNTER
"FOLLOW UP: Appt in office tomorrow     REASON FOR CONVERSATION: Heart Problem    SYMPTOMS: HR of 180bpm with lightheadedness     OTHER: Pt with HR of 180bpm with lightheadedness yesterday while on a walk.  She states that the episode lasted 5-7 minutes.  She states that today she feels normal and that her resting HR at the time of this call is in the 90s.  Pt states that this has never happened to her previously.  Appt offered today per protocol, pt declining.  Appt made for tomorrow.  ED precautions given.     DISPOSITION: See Today in Office        Reason for Disposition   Heart beating very rapidly (e.g., > 140 / minute) and not present now  (Exception: During exercise.)    Answer Assessment - Initial Assessment Questions  1. DESCRIPTION: \"Please describe your heart rate or heartbeat that you are having\" (e.g., fast/slow, regular/irregular, skipped or extra beats, \"palpitations\")      HR of 180 yesterday when walking   2. ONSET: \"When did it start?\" (e.g., minutes, hours, days)       Yesterday walking up a small hill   3. DURATION: \"How long does it last\" (e.g., seconds, minutes, hours)      It lasted 5-7 mins   4. PATTERN \"Does it come and go, or has it been constant since it started?\"  \"Does it get worse with exertion?\"   \"Are you feeling it now?\"      Only occurred one time   5. TAP: \"Using your hand, can you tap out what you are feeling on a chair or table in front of you, so that I can hear?\" Note: Not all patients can do this.        no  6. HEART RATE: \"Can you tell me your heart rate?\" \"How many beats in 15 seconds?\"  Note: Not all patients can do this.        No   7. RECURRENT SYMPTOM: \"Have you ever had this before?\" If Yes, ask: \"When was the last time?\" and \"What happened that time?\"       no  8. CAUSE: \"What do you think is causing the palpitations?\"      no  9. CARDIAC HISTORY: \"Do you have any history of heart disease?\" (e.g., heart attack, angina, bypass surgery, angioplasty, arrhythmia)      " " no  10. OTHER SYMPTOMS: \"Do you have any other symptoms?\" (e.g., dizziness, chest pain, sweating, difficulty breathing)        Lightheaded when it occurred  11. PREGNANCY: \"Is there any chance you are pregnant?\" \"When was your last menstrual period?\"        no    Protocols used: Heart Rate and Heartbeat Questions-Adult-[place]    "

## 2025-03-31 NOTE — ED ATTENDING ATTESTATION
3/31/2025  I, Nino Hunt MD, saw and evaluated the patient. I have discussed the patient with the resident/non-physician practitioner and agree with the resident's/non-physician practitioner's findings, Plan of Care, and MDM as documented in the resident's/non-physician practitioner's note, except where noted. All available labs and Radiology studies were reviewed.  I was present for key portions of any procedure(s) performed by the resident/non-physician practitioner and I was immediately available to provide assistance.       At this point I agree with the current assessment done in the Emergency Department.  I have conducted an independent evaluation of this patient a history and physical is as follows:  Briefly, 20-year-old female with history of rapid heart rate in the past presenting with palpitations as well as some associated shortness of breath.  Patient states about 2 years ago she had some palpitations, had a Holter monitor, was found to have short runs of supraventricular tachycardia but was told not to worry about it.  She states that yesterday she began having rapid heart rate between 160 and 180 bpm based on her Apple Watch, states that she felt short of breath at the time, this stopped on its own, had another brief episode today, this is now resolved and she has no symptoms at this time.  She denies chest pain, trauma, fevers, numbness, weakness, other symptoms.  Of note, patient did start on Vyvanse 2 weeks ago.  On examination, heart sounds normal, lungs clear to auscultation, abdomen nontender, no swelling or tenderness to palpation of the lower extremities.  EKG shows sinus tachycardia but no evidence of acute ischemia, no other arrhythmia.  Chest x-ray clear, labs reassuring other than slightly low potassium and magnesium, likely unrelated although those electrolytes were repleted orally.  Do not suspect rapid A-fib, V-fib, V. tach, other acute life threat.  Some concern for possible  SVT that terminated yesterday prior to arrival.  Discharged with strict return precautions, follow with primary care doctor as well as cardiology  ED Course         Critical Care Time  Procedures

## 2025-03-31 NOTE — DISCHARGE INSTRUCTIONS
Please follow up with cardiology regarding your fast heart rate. It may be related to your new medication you are on, but we do recommend discussing this with them. If symptoms acutely worsen, return to the ED.

## 2025-04-01 ENCOUNTER — OFFICE VISIT (OUTPATIENT)
Dept: FAMILY MEDICINE CLINIC | Facility: CLINIC | Age: 20
End: 2025-04-01
Payer: COMMERCIAL

## 2025-04-01 ENCOUNTER — HOSPITAL ENCOUNTER (EMERGENCY)
Facility: HOSPITAL | Age: 20
Discharge: HOME | End: 2025-04-01
Attending: EMERGENCY MEDICINE
Payer: COMMERCIAL

## 2025-04-01 VITALS
TEMPERATURE: 97.5 F | RESPIRATION RATE: 18 BRPM | HEART RATE: 97 BPM | HEIGHT: 62 IN | DIASTOLIC BLOOD PRESSURE: 74 MMHG | OXYGEN SATURATION: 96 % | BODY MASS INDEX: 32.02 KG/M2 | WEIGHT: 174 LBS | SYSTOLIC BLOOD PRESSURE: 122 MMHG

## 2025-04-01 VITALS
RESPIRATION RATE: 18 BRPM | HEART RATE: 87 BPM | TEMPERATURE: 97.9 F | OXYGEN SATURATION: 97 % | SYSTOLIC BLOOD PRESSURE: 137 MMHG | DIASTOLIC BLOOD PRESSURE: 91 MMHG

## 2025-04-01 DIAGNOSIS — R00.2 PALPITATIONS: Primary | ICD-10-CM

## 2025-04-01 DIAGNOSIS — R00.0 TACHYCARDIA WITH GREATER THAN 160 BEATS PER MINUTE: ICD-10-CM

## 2025-04-01 LAB
ATRIAL RATE: 98 BPM
P AXIS: 79 DEGREES
PR INTERVAL: 128 MS
QRS AXIS: 85 DEGREES
QRSD INTERVAL: 76 MS
QT INTERVAL: 352 MS
QTC INTERVAL: 449 MS
T WAVE AXIS: 66 DEGREES
VENTRICULAR RATE: 98 BPM

## 2025-04-01 PROCEDURE — 99284 EMERGENCY DEPT VISIT MOD MDM: CPT | Performed by: EMERGENCY MEDICINE

## 2025-04-01 PROCEDURE — 93005 ELECTROCARDIOGRAM TRACING: CPT

## 2025-04-01 PROCEDURE — 99214 OFFICE O/P EST MOD 30 MIN: CPT | Performed by: FAMILY MEDICINE

## 2025-04-01 PROCEDURE — 99284 EMERGENCY DEPT VISIT MOD MDM: CPT

## 2025-04-01 PROCEDURE — 93010 ELECTROCARDIOGRAM REPORT: CPT | Performed by: STUDENT IN AN ORGANIZED HEALTH CARE EDUCATION/TRAINING PROGRAM

## 2025-04-01 NOTE — PROGRESS NOTES
Name: Elisa Mcneill      : 2005      MRN: [id number]  Encounter Provider: Kings Laboy MD  Encounter Date: 2025   Encounter department: [facility]  :  Assessment & Plan  Palpitations  Ongoing palpitations and tachycardia.  Concern with heart rates greater than 150.  Patient will likely need to extended Holter monitor (Zio patch).  I am also going to have her hold her Vyvanse for the next few weeks and monitor heart rates on her Apple Watch.  Referral placed to cardiology today.  Orders:    Ambulatory Referral to Cardiology; Future    Tachycardia with greater than 160 beats per minute  Reviewed EKG from ER.  Sinus rhythm.  Apple watch shows heart rate greater than 150.  Orders:    Ambulatory Referral to Cardiology; Future           History of Present Illness   Patient presents with:  Follow-up: One episode of HR in the 180s yesterday. Normal HR and asymptomatic now. Patient went to there ER last night patient was given magnesium and potassium    Patient presents today for follow-up after going to the ER on 3/31 with elevated heart rate.  She had a heart rate in the 180s yesterday.  She is currently asymptomatic now and denies any palpitations.  She was given magnesium and potassium in ER last night.  She was started on Vyvanse in the past month.  And overall has tolerated medications well.  She does have underlying anxiety.  She does wear an Apple Watch which shows heart rates fluctuating throughout the day in the 140s-160s.  EKG in the ER showed sinus tachycardia.  She denies any chest pain or shortness of breath at this time.  D-dimer was negative.      Review of Systems   Constitutional:  Negative for activity change, fatigue and fever.   Eyes:  Negative for visual disturbance.   Respiratory:  Negative for shortness of breath.    Cardiovascular:  Positive for palpitations. Negative for chest pain.   Gastrointestinal:  Negative for abdominal pain, constipation, diarrhea and  "nausea.   Endocrine: Negative for cold intolerance and heat intolerance.   Musculoskeletal:  Negative for back pain.   Skin:  Negative for rash.   Neurological:  Negative for headaches.   Psychiatric/Behavioral:  Negative for confusion.        Objective   /74 (BP Location: Left arm, Patient Position: Sitting, Cuff Size: Standard)   Pulse 97   Temp 97.5 °F (36.4 °C) (Temporal)   Resp 18   Ht 5' 2\" (1.575 m)   Wt 78.9 kg (174 lb)   LMP 02/15/2025   SpO2 96%   BMI 31.83 kg/m²      Physical Exam  Vitals and nursing note reviewed.   Constitutional:       Appearance: Normal appearance. She is well-developed.   HENT:      Head: Normocephalic and atraumatic.   Cardiovascular:      Rate and Rhythm: Regular rhythm. Tachycardia present.      Heart sounds: No murmur heard.  Pulmonary:      Effort: Pulmonary effort is normal.      Breath sounds: Normal breath sounds.   Abdominal:      General: Bowel sounds are normal.      Palpations: Abdomen is soft.   Musculoskeletal:      Cervical back: Normal range of motion.   Skin:     General: Skin is warm.   Neurological:      General: No focal deficit present.      Mental Status: She is alert.   Psychiatric:         Mood and Affect: Mood normal.         Speech: Speech normal.         "

## 2025-04-01 NOTE — ED ATTENDING ATTESTATION
4/1/2025  Claudy BAPTISTE, DO, saw and evaluated the patient. I have discussed the patient with the resident/non-physician practitioner and agree with the resident's/non-physician practitioner's findings, Plan of Care, and MDM as documented in the resident's/non-physician practitioner's note, except where noted. All available labs and Radiology studies were reviewed.  I was present for key portions of any procedure(s) performed by the resident/non-physician practitioner and I was immediately available to provide assistance.       At this point I agree with the current assessment done in the Emergency Department.  I have conducted an independent evaluation of this patient a history and physical is as follows:          1. Palpitations            MDM  Number of Diagnoses or Management Options  Palpitations  Diagnosis management comments:       Initial ED assessment:    20-year-old female, intermittent palpitations, seen here yesterday for similar history of SVT in the past happened again today    Pathology at risk for includes but is not limited to:   Likely SVT consider other arrhythmia consider sinus tachycardia    Initial ED plan:   EKG, cardiac monitor, observe in ED        Final ED summary/disposition:   After evaluation and workup in the emergency department, no events in the emergency department.,  Explained to her I think this is likely intermittent SVT, she will follow cardiology and/or PCP for outpatient Holter monitor               Time reflects when diagnosis was documented in both MDM as applicable and the Disposition within this note       Time User Action Codes Description Comment    4/1/2025  4:26 PM Claudy Burton Add [R00.2] Palpitations     4/1/2025  4:26 PM Claudy Burton Modify [R00.2] Palpitations Presumed intermittent SVT          ED Disposition       ED Disposition   Discharge    Condition   Stable    Date/Time   Tue Apr 1, 2025  4:26 PM    Comment   Elisa Mcneill discharge to  home/self care.                   Follow-up Information       Follow up With Specialties Details Why Contact Info Additional Information    [facility] Cardiology Call in 1 day To arrange for the next available appointment [address]  [address]  [facility] [address] [phone] [facility], [address], [place], [place], [address] [phone]                          Chief Complaint   Patient presents with    Rapid Heart Rate     Pt reports intermittent elevated heart rate and dizziness. Reports heart rate of 141 for about 5 minutes. Seen here yesterday for the same               20-year-old female, here with intermittent palpitations here yesterday for similar had resource intensive workup and was discharged history of SVT in the past.  Yesterday was presumed to be episodes of SVT, presumed to be a recurrent episode again today, but she was nervous about it so she came here feels like the episodes last a few minutes and then resolved currently asymptomatic.  No associated chest pain or shortness of breath with it.                    Visit Vitals  /91 (BP Location: Right arm)   Pulse 87   Temp 97.9 °F (36.6 °C) (Oral)   Resp 18   LMP 02/15/2025   SpO2 97%   OB Status Unknown   Smoking Status Never        Physical Exam  Vitals reviewed.   Constitutional:       General: She is not in acute distress.     Appearance: She is well-developed. She is not diaphoretic.   HENT:      Head: Normocephalic and atraumatic.      Right Ear: External ear normal.      Left Ear: External ear normal.      Nose: Nose normal.   Eyes:      General:         Right eye: No discharge.         Left eye: No discharge.      Pupils: Pupils are equal, round, and reactive to light.   Neck:      Trachea: No tracheal deviation.   Cardiovascular:      Rate and Rhythm: Normal rate and regular rhythm.      Heart sounds: Normal heart sounds. No murmur  heard.  Pulmonary:      Effort: Pulmonary effort is normal. No respiratory distress.      Breath sounds: Normal breath sounds. No stridor.   Abdominal:      General: There is no distension.      Palpations: Abdomen is soft.      Tenderness: There is no abdominal tenderness. There is no guarding or rebound.   Musculoskeletal:         General: Normal range of motion.      Cervical back: Normal range of motion and neck supple.   Skin:     General: Skin is warm and dry.      Coloration: Skin is not pale.      Findings: No erythema.   Neurological:      General: No focal deficit present.      Mental Status: She is alert and oriented to person, place, and time.                       Medications - No data to display          Labs Reviewed - No data to display      No orders to display                  Procedures

## 2025-04-03 NOTE — ED PROVIDER NOTES
Time reflects when diagnosis was documented in both MDM as applicable and the Disposition within this note       Time User Action Codes Description Comment    3/31/2025  6:51 PM Elisha Bourgeois Add [R00.0] Tachycardia           ED Disposition       ED Disposition   Discharge    Condition   Stable    Date/Time   Mon Mar 31, 2025  6:51 PM    Comment   Elisa Mcneill discharge to home/self care.                   Assessment & Plan       Medical Decision Making  20-year-old female patient presenting with rapid heart rate at home.  On initial evaluation, she was with stable vitals, did have mild tachycardia but on EKG rhythm appeared to be sinus rhythm without ectopy or or conduction abnormality.  Otherwise, there were no concerning physical exam findings.  Based on HPI, it is possible patient has intermittent runs of SVT, although other arrhythmias theoretically possible.  It is also possible that she could have other pathology such as PE, orthostasis, among others. With this in mind, CBC, CMP, d-dimer was done and she did have some mild electrolyte derangements that were repleted but not significant enough to likely cause her symptoms. It is likely that the patient is having tachycardia or self-resolving SVT episodes at home, possibly made worse by taking stimulant medication. She did not have recurrence in the ED. Ultimately she was discharged in good condition with counseling to follow up with cardiology or return if symptoms acutely worsen.    Problems Addressed:  Tachycardia: acute illness or injury    Amount and/or Complexity of Data Reviewed  Labs: ordered.  Radiology: ordered.    Risk  Prescription drug management.             Medications   potassium chloride (Klor-Con M20) CR tablet 20 mEq (20 mEq Oral Given 3/31/25 1901)       ED Risk Strat Scores                            SBIRT 22yo+      Flowsheet Row Most Recent Value   Initial Alcohol Screen: US AUDIT-C     1. How often do you have a drink containing  alcohol? 1 Filed at: 03/31/2025 1902   2. How many drinks containing alcohol do you have on a typical day you are drinking?  1 Filed at: 03/31/2025 1902   3b. FEMALE Any Age, or MALE 65+: How often do you have 4 or more drinks on one occassion? 0 Filed at: 03/31/2025 1902   Audit-C Score 2 Filed at: 03/31/2025 1902   LISA: How many times in the past year have you...    Used an illegal drug or used a prescription medication for non-medical reasons? Never Filed at: 03/31/2025 1902                            History of Present Illness       Chief Complaint   Patient presents with    Rapid Heart Rate     Patient reports heart rate 140-160 per apple watch. +SOB. Denies chest pain.        Past Medical History:   Diagnosis Date    Anemia     Anxiety 3/10/22    Asthma     Cancer (HCC)     Coronary artery disease     Depression     Diabetes mellitus (HCC)     Disease of thyroid gland     GERD (gastroesophageal reflux disease)     Headache(784.0)     Hypertension     Kidney stone     Myocardial infarction (HCC)     Sleep apnea     Urinary tract infection       Past Surgical History:   Procedure Laterality Date    CHOLECYSTECTOMY  06-21-23    CHOLECYSTECTOMY LAPAROSCOPIC N/A 06/21/2023    Procedure: CHOLECYSTECTOMY LAPAROSCOPIC: POSSIBLE OPEN;  Surgeon: Kristian Mcmahon MD;  Location: [facility];  Service: General    CORONARY ARTERY BYPASS GRAFT      DENTAL SURGERY        Family History   Problem Relation Age of Onset    Heart disease Father     Hypertension Father     Diabetes Father     Coronary artery disease Father     Hyperlipidemia Father     Deep vein thrombosis Father     Heart attack Father     Thyroid disease Father     Ovarian cancer Maternal Grandmother     Diabetes Maternal Grandmother     Cancer Maternal Grandmother     Arthritis Mother     Anxiety disorder Mother     Anemia Mother     No Known Problems Sister     Dementia Maternal Grandfather     Lupus Maternal Aunt     Clotting disorder Sister       Social  History     Tobacco Use    Smoking status: Never     Passive exposure: Never    Smokeless tobacco: Never   Vaping Use    Vaping status: Every Day    Substances: Nicotine, Flavoring   Substance Use Topics    Alcohol use: No    Drug use: No      E-Cigarette/Vaping    E-Cigarette Use Current Every Day User     Cartridges/Day 0.5       E-Cigarette/Vaping Substances    Nicotine Yes     THC No     CBD No     Flavoring Yes       I have reviewed and agree with the history as documented.     HPI    Patient is a 20-year-old female presenting with concern for rapid heart rate at home.  She says that she had similar symptoms approximately 2 years ago and was previously evaluated by cardiology.  She had a Holter monitor which revealed brief runs of SVT.  She is not on any medications for this currently and it had not recurred until recently.  She recently started taking a new medication, Vyvanse.  She was just sitting at home today when she noticed her Apple Watch alerted her to a heart rate ranging between 160 and 180.  At that time, she noted she was feeling short of breath but denies any chest pain.  On standing up, she felt somewhat dizzy.  This is now completely resolved.    Review of Systems   Constitutional:  Negative for chills and fever.   HENT:  Negative for ear pain and sore throat.    Eyes:  Negative for pain and visual disturbance.   Respiratory:  Positive for shortness of breath. Negative for cough.    Cardiovascular:  Positive for palpitations. Negative for chest pain.   Gastrointestinal:  Negative for abdominal pain and vomiting.   Genitourinary:  Negative for dysuria and hematuria.   Musculoskeletal:  Negative for arthralgias and back pain.   Skin:  Negative for color change and rash.   Neurological:  Positive for light-headedness. Negative for seizures and syncope.   All other systems reviewed and are negative.          Objective       ED Triage Vitals   Temperature Pulse Blood Pressure Respirations SpO2  Patient Position - Orthostatic VS   03/31/25 1815 03/31/25 1744 03/31/25 1744 03/31/25 1744 03/31/25 1744 --   97.7 °F (36.5 °C) (!) 108 142/89 18 100 %       Temp Source Heart Rate Source BP Location FiO2 (%) Pain Score    03/31/25 1815 -- -- -- --    Oral          Vitals      Date and Time Temp Pulse SpO2 Resp BP Pain Score FACES Pain Rating User   03/31/25 1815 97.7 °F (36.5 °C) 98 100 % -- -- -- -- RC   03/31/25 1744 -- 108 100 % 18 142/89 -- -- MD            Physical Exam  Vitals and nursing note reviewed.   Constitutional:       General: She is not in acute distress.     Appearance: She is well-developed.   HENT:      Head: Normocephalic and atraumatic.   Eyes:      Conjunctiva/sclera: Conjunctivae normal.   Cardiovascular:      Rate and Rhythm: Regular rhythm. Tachycardia present.      Heart sounds: No murmur heard.  Pulmonary:      Effort: Pulmonary effort is normal. No respiratory distress.      Breath sounds: Normal breath sounds.   Abdominal:      Palpations: Abdomen is soft.      Tenderness: There is no abdominal tenderness.   Musculoskeletal:         General: No swelling.   Skin:     General: Skin is warm and dry.   Neurological:      Mental Status: She is alert.   Psychiatric:         Mood and Affect: Mood normal.         Results Reviewed       Procedure Component Value Units Date/Time    TSH, 3rd generation with Free T4 reflex [[id number]]  (Normal) Collected: 03/31/25 1756    Lab Status: Final result Specimen: Blood from Arm, Right Updated: 03/31/25 1858     TSH 3RD GENERATON 1.368 uIU/mL     HS Troponin 0hr (reflex protocol) [[id number]]  (Normal) Collected: 03/31/25 1756    Lab Status: Final result Specimen: Blood from Arm, Right Updated: 03/31/25 1830     hs TnI 0hr <2 ng/L     POCT pregnancy, urine [[id number]]  (Normal) Collected: 03/31/25 1827    Lab Status: Final result Updated: 03/31/25 1827     EXT Preg Test, Ur Negative     Control Valid    Comprehensive metabolic panel [[id number]]  (Abnormal)  Collected: 03/31/25 1756    Lab Status: Final result Specimen: Blood from Arm, Right Updated: 03/31/25 1826     Sodium 136 mmol/L      Potassium 3.4 mmol/L      Chloride 102 mmol/L      CO2 23 mmol/L      ANION GAP 11 mmol/L      BUN 4 mg/dL      Creatinine 0.76 mg/dL      Glucose 84 mg/dL      Calcium 9.5 mg/dL      AST 43 U/L      ALT 32 U/L      Alkaline Phosphatase 95 U/L      Total Protein 7.5 g/dL      Albumin 4.4 g/dL      Total Bilirubin 0.47 mg/dL      eGFR 111 ml/min/1.73sq m     Narrative:      National Kid[employer] guidelines for Chronic Kidney Disease (CKD):     Stage 1 with normal or high GFR (GFR > 90 mL/min/1.73 square meters)    Stage 2 Mild CKD (GFR = 60-89 mL/min/1.73 square meters)    Stage 3A Moderate CKD (GFR = 45-59 mL/min/1.73 square meters)    Stage 3B Moderate CKD (GFR = 30-44 mL/min/1.73 square meters)    Stage 4 Severe CKD (GFR = 15-29 mL/min/1.73 square meters)    Stage 5 End Stage CKD (GFR <15 mL/min/1.73 square meters)  Note: GFR calculation is accurate only with a steady state creatinine    Magnesium [[id number]]  (Abnormal) Collected: 03/31/25 1756    Lab Status: Final result Specimen: Blood from Arm, Right Updated: 03/31/25 1826     Magnesium 1.8 mg/dL     D-Dimer [[id number]]  (Normal) Collected: 03/31/25 1756    Lab Status: Final result Specimen: Blood from Arm, Right Updated: 03/31/25 1822     D-Dimer, Quant 0.34 ug/ml FEU     CBC and differential [[id number]]  (Abnormal) Collected: 03/31/25 1756    Lab Status: Final result Specimen: Blood from Arm, Right Updated: 03/31/25 1806     WBC 12.78 Thousand/uL      RBC 4.67 Million/uL      Hemoglobin 12.7 g/dL      Hematocrit 39.5 %      MCV 85 fL      MCH 27.2 pg      MCHC 32.2 g/dL      RDW 12.9 %      MPV 9.7 fL      Platelets 412 Thousands/uL      nRBC 0 /100 WBCs      Segmented % 65 %      Immature Grans % 0 %      Lymphocytes % 29 %      Monocytes % 6 %      Eosinophils Relative 0 %      Basophils Relative 0 %       Absolute Neutrophils 8.26 Thousands/µL      Absolute Immature Grans 0.04 Thousand/uL      Absolute Lymphocytes 3.67 Thousands/µL      Absolute Monocytes 0.74 Thousand/µL      Eosinophils Absolute 0.04 Thousand/µL      Basophils Absolute 0.03 Thousands/µL             XR chest portable   Final Interpretation by Tres Michelle MD (04/01 0833)      No acute cardiopulmonary disease.            Workstation performed: [id number]             ECG 12 Lead Documentation Only    Date/Time: 3/31/2025 8:47 PM    Performed by: Elisha Bourgeois DO  Authorized by: Elisha Bourgeois DO    Indications / Diagnosis:  Palpitations  ECG reviewed by me, the ED Provider: yes    Patient location:  ED  Interpretation:     Interpretation: non-specific    Rate:     ECG rate:  104    ECG rate assessment: tachycardic    Rhythm:     Rhythm: sinus tachycardia    Ectopy:     Ectopy: none    QRS:     QRS axis:  Normal  Conduction:     Conduction: normal    ST segments:     ST segments:  Normal  T waves:     T waves: normal        ED Medication and Procedure Management   Prior to Admission Medications   Prescriptions Last Dose Informant Patient Reported? Taking?   ALPRAZolam (XANAX) 0.5 mg tablet  Self No No   Sig: Take 1 tablet (0.5 mg total) by mouth daily at bedtime as needed for anxiety for up to 10 days   albuterol (PROVENTIL HFA,VENTOLIN HFA) 90 mcg/act inhaler  Self No No   Sig: Inhale 2 puffs every 6 (six) hours as needed for wheezing   escitalopram (LEXAPRO) 20 mg tablet  Self No No   Sig: Take 1 tablet (20 mg total) by mouth daily   hydrocortisone 2.5 % cream  Self No No   Sig: Apply topically 4 (four) times a day as needed for rash   lisdexamfetamine (VYVANSE) 20 MG capsule  Self No No   Sig: Take 1 capsule (20 mg total) by mouth every morning Max Daily Amount: 20 mg   norelgestromin-ethinyl estradiol (Xulane) 150-35 MCG/24HR  Self No No   Sig: APPLY 1 PATCH ONE TIME PER WEEK   traZODone (DESYREL) 100 mg tablet   Self No No   Sig: Take 1 tablet (100 mg total) by mouth daily at bedtime as needed for sleep      Facility-Administered Medications: None     Discharge Medication List as of 3/31/2025  7:06 PM        CONTINUE these medications which have NOT CHANGED    Details   albuterol (PROVENTIL HFA,VENTOLIN HFA) 90 mcg/act inhaler Inhale 2 puffs every 6 (six) hours as needed for wheezing, Starting Thu 5/4/2023, Normal      ALPRAZolam (XANAX) 0.5 mg tablet Take 1 tablet (0.5 mg total) by mouth daily at bedtime as needed for anxiety for up to 10 days, Starting Thu 9/7/2023, Until Fri 3/14/2025 at 2359, Normal      escitalopram (LEXAPRO) 20 mg tablet Take 1 tablet (20 mg total) by mouth daily, Starting Tue 6/18/2024, Normal      hydrocortisone 2.5 % cream Apply topically 4 (four) times a day as needed for rash, Starting Mon 8/19/2024, Normal      lisdexamfetamine (VYVANSE) 20 MG capsule Take 1 capsule (20 mg total) by mouth every morning Max Daily Amount: 20 mg, Starting Fri 3/14/2025, Normal      norelgestromin-ethinyl estradiol (Xulane) 150-35 MCG/24HR APPLY 1 PATCH ONE TIME PER WEEK, Normal      traZODone (DESYREL) 100 mg tablet Take 1 tablet (100 mg total) by mouth daily at bedtime as needed for sleep, Starting Fri 3/14/2025, Until Wed 9/10/2025 at 2359, Normal           No discharge procedures on file.  ED SEPSIS DOCUMENTATION   Time reflects when diagnosis was documented in both MDM as applicable and the Disposition within this note       Time User Action Codes Description Comment    3/31/2025  6:51 PM Elisha Bourgeois [R00.0] Tachycardia                  Elisha Bourgeois DO  04/03/25 0240

## 2025-04-03 NOTE — ED PROVIDER NOTES
Time reflects when diagnosis was documented in both MDM as applicable and the Disposition within this note       Time User Action Codes Description Comment    4/1/2025  4:26 PM Claudy Burton Add [R00.2] Palpitations     4/1/2025  4:26 PM Claudy Burton Modify [R00.2] Palpitations Presumed intermittent SVT          ED Disposition       ED Disposition   Discharge    Condition   Stable    Date/Time   Tue Apr 1, 2025  4:26 PM    Comment   Elisa Mcneill discharge to home/self care.                   Assessment & Plan       Medical Decision Making  20-year-old female patient presenting with rapid heart rate at work.  On initial evaluation, she was with stable vitals, did have mild tachycardia but on EKG rhythm appeared to be sinus rhythm without ectopy or or conduction abnormality.  Otherwise, there were no concerning physical exam findings.  Based on HPI, it is possible patient has intermittent runs of SVT, although other arrhythmias theoretically possible.  It is also possible that she could have other pathology such as PE, orthostasis, among others. Discussed continued importance of following up with cardiology. Rhythm remained sinus with relatively normal rate while in the ED. Did re-emphasize vagal techniques and encouraged fluid intake.   She was discharged in stable condition with counseling to follow with cardiology or return if symptoms recur and rapid heart rate does not resolve.    Problems Addressed:  Palpitations: acute illness or injury             Medications - No data to display    ED Risk Strat Scores                                                History of Present Illness       Chief Complaint   Patient presents with    Rapid Heart Rate     Pt reports intermittent elevated heart rate and dizziness. Reports heart rate of 141 for about 5 minutes. Seen here yesterday for the same         Past Medical History:   Diagnosis Date    Anemia     Anxiety 3/10/22    Asthma     Cancer (HCC)     Coronary  artery disease     Depression     Diabetes mellitus (HCC)     Disease of thyroid gland     GERD (gastroesophageal reflux disease)     Headache(784.0)     Hypertension     Kidney stone     Myocardial infarction (HCC)     Sleep apnea     Urinary tract infection       Past Surgical History:   Procedure Laterality Date    CHOLECYSTECTOMY  06-21-23    CHOLECYSTECTOMY LAPAROSCOPIC N/A 06/21/2023    Procedure: CHOLECYSTECTOMY LAPAROSCOPIC: POSSIBLE OPEN;  Surgeon: Kristian Mcmahon MD;  Location: [facility];  Service: General    CORONARY ARTERY BYPASS GRAFT      DENTAL SURGERY        Family History   Problem Relation Age of Onset    Heart disease Father     Hypertension Father     Diabetes Father     Coronary artery disease Father     Hyperlipidemia Father     Deep vein thrombosis Father     Heart attack Father     Thyroid disease Father     Ovarian cancer Maternal Grandmother     Diabetes Maternal Grandmother     Cancer Maternal Grandmother     Arthritis Mother     Anxiety disorder Mother     Anemia Mother     No Known Problems Sister     Dementia Maternal Grandfather     Lupus Maternal Aunt     Clotting disorder Sister       Social History     Tobacco Use    Smoking status: Never     Passive exposure: Never    Smokeless tobacco: Never   Vaping Use    Vaping status: Every Day    Substances: Nicotine, Flavoring   Substance Use Topics    Alcohol use: No    Drug use: No      E-Cigarette/Vaping    E-Cigarette Use Current Every Day User     Cartridges/Day 0.5       E-Cigarette/Vaping Substances    Nicotine Yes     THC No     CBD No     Flavoring Yes       I have reviewed and agree with the history as documented.     HPI    Patient is a 19 yo F with history of SVT on holter previously, palpitations presenting with fast heart rate from home. Was previously evaluated in the ED yesterday for the same today. Since her visit yesterday, she was at work walking around when her heart rate increased again and she became dizzy. This  now resolved. Otherwise, no acute changes. Has not made any medication changes.     Review of Systems   Constitutional:  Negative for chills and fever.   HENT:  Negative for ear pain and sore throat.    Eyes:  Negative for pain and visual disturbance.   Respiratory:  Negative for cough and shortness of breath.    Cardiovascular:  Positive for palpitations. Negative for chest pain.   Gastrointestinal:  Negative for abdominal pain and vomiting.   Genitourinary:  Negative for dysuria and hematuria.   Musculoskeletal:  Negative for arthralgias and back pain.   Skin:  Negative for color change and rash.   Neurological:  Positive for light-headedness. Negative for seizures and syncope.   All other systems reviewed and are negative.          Objective       ED Triage Vitals [04/01/25 1529]   Temperature Pulse Blood Pressure Respirations SpO2 Patient Position - Orthostatic VS   97.9 °F (36.6 °C) 87 137/91 18 97 % Sitting      Temp Source Heart Rate Source BP Location FiO2 (%) Pain Score    Oral Monitor Right arm -- --      Vitals      Date and Time Temp Pulse SpO2 Resp BP Pain Score FACES Pain Rating User   04/01/25 1529 97.9 °F (36.6 °C) 87 97 % 18 137/91 -- -- EM            Physical Exam  Vitals and nursing note reviewed.   Constitutional:       General: She is not in acute distress.     Appearance: She is well-developed.   HENT:      Head: Normocephalic and atraumatic.   Eyes:      Conjunctiva/sclera: Conjunctivae normal.   Cardiovascular:      Rate and Rhythm: Normal rate and regular rhythm.      Heart sounds: No murmur heard.  Pulmonary:      Effort: Pulmonary effort is normal. No respiratory distress.      Breath sounds: Normal breath sounds.   Abdominal:      Palpations: Abdomen is soft.      Tenderness: There is no abdominal tenderness.   Musculoskeletal:         General: No swelling.   Skin:     General: Skin is warm and dry.   Neurological:      Mental Status: She is alert.   Psychiatric:         Mood and Affect:  Mood normal.         Results Reviewed       None            No orders to display       ECG 12 Lead Documentation Only    Date/Time: 4/1/2025 4:19 PM    Performed by: Elisha Bourgeois DO  Authorized by: Elisha Bourgeois DO    Indications / Diagnosis:  Palpitations  Patient location:  ED  Previous ECG:     Previous ECG:  Compared to current    Comparison ECG info:  3/31    Similarity:  No change  Interpretation:     Interpretation: normal    Rate:     ECG rate:  98    ECG rate assessment: tachycardic    Rhythm:     Rhythm: sinus tachycardia    Ectopy:     Ectopy: none    QRS:     QRS axis:  Normal  Conduction:     Conduction: normal    ST segments:     ST segments:  Normal  T waves:     T waves: normal        ED Medication and Procedure Management   Prior to Admission Medications   Prescriptions Last Dose Informant Patient Reported? Taking?   ALPRAZolam (XANAX) 0.5 mg tablet  Self No No   Sig: Take 1 tablet (0.5 mg total) by mouth daily at bedtime as needed for anxiety for up to 10 days   albuterol (PROVENTIL HFA,VENTOLIN HFA) 90 mcg/act inhaler  Self No No   Sig: Inhale 2 puffs every 6 (six) hours as needed for wheezing   escitalopram (LEXAPRO) 20 mg tablet  Self No No   Sig: Take 1 tablet (20 mg total) by mouth daily   hydrocortisone 2.5 % cream  Self No No   Sig: Apply topically 4 (four) times a day as needed for rash   lisdexamfetamine (VYVANSE) 20 MG capsule  Self No No   Sig: Take 1 capsule (20 mg total) by mouth every morning Max Daily Amount: 20 mg   norelgestromin-ethinyl estradiol (Xulane) 150-35 MCG/24HR  Self No No   Sig: APPLY 1 PATCH ONE TIME PER WEEK   traZODone (DESYREL) 100 mg tablet  Self No No   Sig: Take 1 tablet (100 mg total) by mouth daily at bedtime as needed for sleep      Facility-Administered Medications: None     Discharge Medication List as of 4/1/2025  4:27 PM        CONTINUE these medications which have NOT CHANGED    Details   albuterol (PROVENTIL  HFA,VENTOLIN HFA) 90 mcg/act inhaler Inhale 2 puffs every 6 (six) hours as needed for wheezing, Starting Thu 5/4/2023, Normal      ALPRAZolam (XANAX) 0.5 mg tablet Take 1 tablet (0.5 mg total) by mouth daily at bedtime as needed for anxiety for up to 10 days, Starting Thu 9/7/2023, Until Tue 4/1/2025 at 2359, Normal      escitalopram (LEXAPRO) 20 mg tablet Take 1 tablet (20 mg total) by mouth daily, Starting Tue 6/18/2024, Normal      hydrocortisone 2.5 % cream Apply topically 4 (four) times a day as needed for rash, Starting Mon 8/19/2024, Normal      lisdexamfetamine (VYVANSE) 20 MG capsule Take 1 capsule (20 mg total) by mouth every morning Max Daily Amount: 20 mg, Starting Fri 3/14/2025, Normal      norelgestromin-ethinyl estradiol (Xulane) 150-35 MCG/24HR APPLY 1 PATCH ONE TIME PER WEEK, Normal      traZODone (DESYREL) 100 mg tablet Take 1 tablet (100 mg total) by mouth daily at bedtime as needed for sleep, Starting Fri 3/14/2025, Until Wed 9/10/2025 at 2359, Normal           No discharge procedures on file.  ED SEPSIS DOCUMENTATION   Time reflects when diagnosis was documented in both MDM as applicable and the Disposition within this note       Time User Action Codes Description Comment    4/1/2025  4:26 PM Claudy Burton Add [R00.2] Palpitations     4/1/2025  4:26 PM Claudy Burton Modify [R00.2] Palpitations Presumed intermittent SVT                 Elisha Bourgeois,   04/03/25 6461

## 2025-04-09 ENCOUNTER — OFFICE VISIT (OUTPATIENT)
Dept: CARDIOLOGY CLINIC | Facility: CLINIC | Age: 20
End: 2025-04-09
Payer: COMMERCIAL

## 2025-04-09 VITALS
DIASTOLIC BLOOD PRESSURE: 80 MMHG | OXYGEN SATURATION: 100 % | SYSTOLIC BLOOD PRESSURE: 128 MMHG | HEART RATE: 93 BPM | BODY MASS INDEX: 32.2 KG/M2 | HEIGHT: 62 IN | TEMPERATURE: 97.3 F | WEIGHT: 175 LBS

## 2025-04-09 DIAGNOSIS — R00.0 TACHYCARDIA WITH GREATER THAN 160 BEATS PER MINUTE: ICD-10-CM

## 2025-04-09 DIAGNOSIS — F50.811 MODERATE BINGE-EATING DISORDER: ICD-10-CM

## 2025-04-09 DIAGNOSIS — E87.6 HYPOKALEMIA: ICD-10-CM

## 2025-04-09 DIAGNOSIS — R00.2 PALPITATIONS: ICD-10-CM

## 2025-04-09 DIAGNOSIS — E83.42 HYPOMAGNESEMIA: ICD-10-CM

## 2025-04-09 PROCEDURE — 99214 OFFICE O/P EST MOD 30 MIN: CPT

## 2025-04-09 RX ORDER — LISDEXAMFETAMINE DIMESYLATE 20 MG/1
20 CAPSULE ORAL EVERY MORNING
Qty: 30 CAPSULE | Refills: 0 | Status: SHIPPED | OUTPATIENT
Start: 2025-04-09 | End: 2025-04-11

## 2025-04-09 NOTE — PROGRESS NOTES
Elisa CHO Cadenwilmer  2005  [id number]  [facility] [address] [facility] [address] [phone]    1. Tachycardia with greater than 160 beats per minute  Ambulatory Referral to Cardiology      2. Palpitations  Ambulatory Referral to Cardiology    Zio Monitor      3. Hypokalemia  Basic metabolic panel      4. Hypomagnesemia  Basic metabolic panel    Magnesium      5. Tachycardia  Zio Monitor          Summary/Discussion:  Palpitations/sinus tachycardia   - hx of   - 24 hr Holter (6/2023): she was in normal sinus rhythm throughout holter monitoring. Rare premature ventricular contractions with no non-sustained ventricular tachycardia. Rare premature atrial contractions with no supraventricular tachycardia. No significant pauses. Symptoms on diary did not correlate with any specific dysrhythmia  - s/p ED visit on 3/31/2025 and 4/1/2025 for palpitations/tachycardia   EKG's demonstrated sinus tachycardia as well as sinus rhythm   CXR (3/31/2025): unremarkable   TSH (3/31/2025): normal   troponin (3/31/2025): negative x 1  potassium (3/31/2025): 3.4  magnesium (3/31/2025): 1.8  repleted at that time  - she continues to have daily symptoms of palpitations with evidence of elevated heart rates, intermittently in the 160's on her Apple Watch. She reports associated symptoms of lightheadedness and near syncope. These episodes generally occur when walking up a flight of stairs or standing for an extended period of time  she was recently instructed by her PCP to hold her Vyvanse for the next few weeks and monitor heart rates on her Apple Watch  denies any improvement in her symptoms/heart rates while being off of her Vyvanse   ok to restart Vyvanse from a cardiac perspective in which I recommended she follow up with her PCP to further discuss   - recently she has had increased stress levels with her dad being in the hospital   - heart rate 93 bpm today  - recommend 2 week zio to  correlate symptoms of palpitations with any cardiac rhythm abnormality  - consider obtaining a tilt table test if her symptoms continue/worsen and if zio is unremarkable    - encourage adequate hydration, limited caffeine intake and regular exercise   - repeat BMP and magnesium     Family hx of premature CAD:  - lipid panel:   Latest Reference Range & Units 06/16/23 08:39   Cholesterol See Comment mg/dL 173   Triglycerides See Comment mg/dL 105   HDL >=50 mg/dL 62   LDL Calculated 0 - 100 mg/dL 90   - encouraged annual lipid follow up with her PCP    Interval History: Elisa Mcneill is a 20 y.o. year old female with history mentioned in problem list who presents to the office today for a follow up s/p ED visits.     Today, she reports continued symptoms of daily palpitations with evidence of elevated heart rates, intermittently in the 160's on her Apple Watch. She reports associated symptoms of lightheadedness and near syncope. These episodes generally occur when walking up a flight of stairs or standing for an extended period of time. She was recently instructed by her PCP to hold her Vyvanse for the next few weeks and monitor heart rates on her Apple Watch. She denies any improvement in her symptoms/heart rates while being off of her Vyvanse. Recently she has had increase stress levels with her dad being in the hospital. Denies increased caffeine intake and denies alcohol/substance use. She states adequate hydration throughout the day. She denies any chest pain/pressure/discomfort. She denies lower extremity edema, orthopnea, and PND. She denies dizziness and syncope.    She will RTO in 3 months with myself or sooner if necessary. She will call with any concerns.       Medical Problems       Problem List       Menorrhagia with regular cycle    Generalized anxiety disorder    Sleep disturbance    Panic attacks    Low ferritin level    Acute cholecystitis due to biliary calculus    Mild intermittent asthma         Past Medical History:   Diagnosis Date    Anemia     Anxiety 3/10/22    Asthma     Cancer (HCC)     Coronary artery disease     Depression     Diabetes mellitus (HCC)     Disease of thyroid gland     GERD (gastroesophageal reflux disease)     Headache(784.0)     Hypertension     Kidney stone     Myocardial infarction (HCC)     Sleep apnea     Urinary tract infection      Social History     Socioeconomic History    Marital status: Single     Spouse name: Not on file    Number of children: Not on file    Years of education: Not on file    Highest education level: Not on file   Occupational History    Not on file   Tobacco Use    Smoking status: Never     Passive exposure: Never    Smokeless tobacco: Never   Vaping Use    Vaping status: Every Day    Substances: Nicotine, Flavoring   Substance and Sexual Activity    Alcohol use: No    Drug use: No    Sexual activity: Yes     Partners: Male     Birth control/protection: OCP     Comment: declines STD /HIV testing    Other Topics Concern    Not on file   Social History Narrative    Not on file     Social Drivers of Health     Financial Resource Strain: Not on file   Food Insecurity: Patient Declined (3/11/2025)    Hunger Vital Sign     Worried About Running Out of Food in the Last Year: Patient declined     Ran Out of Food in the Last Year: Patient declined   Transportation Needs: No Transportation Needs (3/11/2025)    PRAPARE - Transportation     Lack of Transportation (Medical): No     Lack of Transportation (Non-Medical): No   Physical Activity: Not on file   Stress: Not on file   Social Connections: Not on file   Intimate Partner Violence: Not on file   Housing Stability: Unknown (3/11/2025)    Housing Stability Vital Sign     Unable to Pay for Housing in the Last Year: No     Number of Times Moved in the Last Year: Not on file     Homeless in the Last Year: No      Family History   Problem Relation Age of Onset    Heart disease Father     Hypertension Father      Diabetes Father     Coronary artery disease Father     Hyperlipidemia Father     Deep vein thrombosis Father     Heart attack Father     Thyroid disease Father     Ovarian cancer Maternal Grandmother     Diabetes Maternal Grandmother     Cancer Maternal Grandmother     Arthritis Mother     Anxiety disorder Mother     Anemia Mother     No Known Problems Sister     Dementia Maternal Grandfather     Lupus Maternal Aunt     Clotting disorder Sister      Past Surgical History:   Procedure Laterality Date    CHOLECYSTECTOMY  06-21-23    CHOLECYSTECTOMY LAPAROSCOPIC N/A 06/21/2023    Procedure: CHOLECYSTECTOMY LAPAROSCOPIC: POSSIBLE OPEN;  Surgeon: Kristian Mcmahon MD;  [facility]: [facility];  Service: General    CORONARY ARTERY BYPASS GRAFT      DENTAL SURGERY         Current Outpatient Medications:     albuterol (PROVENTIL HFA,VENTOLIN HFA) 90 mcg/act inhaler, Inhale 2 puffs every 6 (six) hours as needed for wheezing, Disp: 8 g, Rfl: 0    ALPRAZolam (XANAX) 0.5 mg tablet, Take 1 tablet (0.5 mg total) by mouth daily at bedtime as needed for anxiety for up to 10 days, Disp: 10 tablet, Rfl: 0    escitalopram (LEXAPRO) 20 mg tablet, Take 1 tablet (20 mg total) by mouth daily, Disp: 90 tablet, Rfl: 3    norelgestromin-ethinyl estradiol (Xulane) 150-35 MCG/24HR, APPLY 1 PATCH ONE TIME PER WEEK, Disp: 3 patch, Rfl: 1    traZODone (DESYREL) 100 mg tablet, Take 1 tablet (100 mg total) by mouth daily at bedtime as needed for sleep, Disp: 90 tablet, Rfl: 1    lisdexamfetamine (VYVANSE) 20 MG capsule, Take 1 capsule (20 mg total) by mouth every morning Max Daily Amount: 20 mg, Disp: 30 capsule, Rfl: 0  Allergies   Allergen Reactions    Buspar [Buspirone] Other (See Comments)     Nightmares       Penicillins Rash       Labs:     Chemistry        Component Value Date/Time     04/20/2014 2332    K 3.4 (L) 03/31/2025 1756    K 3.7 04/20/2014 2332     03/31/2025 1756     04/20/2014 2332    CO2 23 03/31/2025 1756     "CO2 28 04/20/2014 2332    BUN 4 (L) 03/31/2025 1756    BUN 14 04/20/2014 2332    CREATININE 0.76 03/31/2025 1756    CREATININE 0.46 (L) 04/20/2014 2332        Component Value Date/Time    CALCIUM 9.5 03/31/2025 1756    CALCIUM 9.6 04/20/2014 2332    ALKPHOS 95 03/31/2025 1756    ALKPHOS 355 04/20/2014 2332    AST 43 (H) 03/31/2025 1756    AST 26 04/20/2014 2332    ALT 32 03/31/2025 1756    ALT 24 04/20/2014 2332    BILITOT 0.3 04/20/2014 2332            No results found for: \"CHOL\"  Lab Results   Component Value Date    HDL 62 06/16/2023     Lab Results   Component Value Date    LDLCALC 90 06/16/2023     Lab Results   Component Value Date    TRIG 105 06/16/2023     No results found for: \"CHOLHDL\"    Imaging: XR chest portable  Result Date: 4/1/2025  Narrative: XR CHEST PORTABLE INDICATION: dyspnea. COMPARISON: 1/3/2025 FINDINGS: Clear lungs. No pneumothorax or pleural effusion. Normal cardiomediastinal silhouette. Bones are unremarkable for age. Normal upper abdomen.     Impression: No acute cardiopulmonary disease. Workstation performed: [id number]       ECG:  n/a    Review of Systems   Cardiovascular:  Positive for near-syncope and palpitations.   Respiratory:  Positive for shortness of breath.    Psychiatric/Behavioral:  The patient is nervous/anxious.    All other systems reviewed and are negative.      Vitals:    04/09/25 0804   BP: 128/80   Pulse: 93   Temp: (!) 97.3 °F (36.3 °C)   SpO2: 100%     Vitals:    04/09/25 0804   Weight: 79.4 kg (175 lb)     Height: 5' 2\" (157.5 cm)   Body mass index is 32.01 kg/m².    Physical Exam  Vitals and nursing note reviewed.   Constitutional:       General: She is not in acute distress.     Appearance: Normal appearance. She is not ill-appearing.   HENT:      Head: Normocephalic.      Nose: Nose normal.      Mouth/Throat:      Mouth: Mucous membranes are moist.      Pharynx: Oropharynx is clear.   Cardiovascular:      Rate and Rhythm: Normal rate and regular rhythm.      " Pulses: Normal pulses.      Heart sounds: Normal heart sounds. No murmur heard.  Pulmonary:      Effort: Pulmonary effort is normal.      Breath sounds: Normal breath sounds.   Musculoskeletal:         General: Normal range of motion.      Cervical back: Normal range of motion.      Right lower leg: No edema.      Left lower leg: No edema.   Skin:     General: Skin is warm and dry.   Neurological:      Mental Status: She is alert and oriented to person, place, and time.   Psychiatric:         Mood and Affect: Mood normal.         Behavior: Behavior normal.

## 2025-04-11 DIAGNOSIS — F90.9 ATTENTION DEFICIT HYPERACTIVITY DISORDER (ADHD), UNSPECIFIED ADHD TYPE: Primary | ICD-10-CM

## 2025-04-11 DIAGNOSIS — F50.811 MODERATE BINGE-EATING DISORDER: ICD-10-CM

## 2025-04-11 RX ORDER — ATOMOXETINE 40 MG/1
40 CAPSULE ORAL 2 TIMES DAILY
Qty: 60 CAPSULE | Refills: 2 | Status: SHIPPED | OUTPATIENT
Start: 2025-04-11

## 2025-04-24 ENCOUNTER — APPOINTMENT (OUTPATIENT)
Dept: LAB | Facility: MEDICAL CENTER | Age: 20
End: 2025-04-24
Payer: COMMERCIAL

## 2025-04-24 DIAGNOSIS — E87.6 HYPOKALEMIA: ICD-10-CM

## 2025-04-24 DIAGNOSIS — E83.42 HYPOMAGNESEMIA: ICD-10-CM

## 2025-04-24 LAB
ANION GAP SERPL CALCULATED.3IONS-SCNC: 8 MMOL/L (ref 4–13)
BUN SERPL-MCNC: 8 MG/DL (ref 5–25)
CALCIUM SERPL-MCNC: 9.7 MG/DL (ref 8.4–10.2)
CHLORIDE SERPL-SCNC: 103 MMOL/L (ref 96–108)
CO2 SERPL-SCNC: 29 MMOL/L (ref 21–32)
CREAT SERPL-MCNC: 0.7 MG/DL (ref 0.6–1.3)
GFR SERPL CREATININE-BSD FRML MDRD: 123 ML/MIN/1.73SQ M
GLUCOSE SERPL-MCNC: 96 MG/DL (ref 65–140)
MAGNESIUM SERPL-MCNC: 1.9 MG/DL (ref 1.9–2.7)
POTASSIUM SERPL-SCNC: 4 MMOL/L (ref 3.5–5.3)
SODIUM SERPL-SCNC: 140 MMOL/L (ref 135–147)

## 2025-04-24 PROCEDURE — 83735 ASSAY OF MAGNESIUM: CPT

## 2025-04-24 PROCEDURE — 36415 COLL VENOUS BLD VENIPUNCTURE: CPT

## 2025-04-24 PROCEDURE — 80048 BASIC METABOLIC PNL TOTAL CA: CPT

## 2025-04-25 ENCOUNTER — RESULTS FOLLOW-UP (OUTPATIENT)
Dept: CARDIOLOGY CLINIC | Facility: CLINIC | Age: 20
End: 2025-04-25

## 2025-04-30 ENCOUNTER — OFFICE VISIT (OUTPATIENT)
Dept: FAMILY MEDICINE CLINIC | Facility: CLINIC | Age: 20
End: 2025-04-30
Payer: COMMERCIAL

## 2025-04-30 ENCOUNTER — APPOINTMENT (OUTPATIENT)
Dept: LAB | Facility: MEDICAL CENTER | Age: 20
End: 2025-04-30
Payer: COMMERCIAL

## 2025-04-30 VITALS
WEIGHT: 169.5 LBS | HEIGHT: 62 IN | BODY MASS INDEX: 31.19 KG/M2 | OXYGEN SATURATION: 100 % | TEMPERATURE: 98 F | SYSTOLIC BLOOD PRESSURE: 118 MMHG | DIASTOLIC BLOOD PRESSURE: 74 MMHG | HEART RATE: 68 BPM | RESPIRATION RATE: 18 BRPM

## 2025-04-30 DIAGNOSIS — Z11.3 SCREENING FOR STDS (SEXUALLY TRANSMITTED DISEASES): ICD-10-CM

## 2025-04-30 DIAGNOSIS — R39.9 UTI SYMPTOMS: Primary | ICD-10-CM

## 2025-04-30 LAB
BACTERIA UR QL AUTO: ABNORMAL /HPF
BILIRUB UR QL STRIP: NEGATIVE
CLARITY UR: CLEAR
COLOR UR: ABNORMAL
GLUCOSE UR STRIP-MCNC: NEGATIVE MG/DL
HGB UR QL STRIP.AUTO: NEGATIVE
KETONES UR STRIP-MCNC: NEGATIVE MG/DL
LEUKOCYTE ESTERASE UR QL STRIP: NEGATIVE
MUCOUS THREADS UR QL AUTO: ABNORMAL
NITRITE UR QL STRIP: NEGATIVE
NON-SQ EPI CELLS URNS QL MICRO: ABNORMAL /HPF
PH UR STRIP.AUTO: 6.5 [PH]
PROT UR STRIP-MCNC: NEGATIVE MG/DL
RBC #/AREA URNS AUTO: ABNORMAL /HPF
SL AMB  POCT GLUCOSE, UA: NORMAL
SL AMB LEUKOCYTE ESTERASE,UA: NORMAL
SL AMB POCT BILIRUBIN,UA: NORMAL
SL AMB POCT BLOOD,UA: NORMAL
SL AMB POCT CLARITY,UA: CLEAR
SL AMB POCT COLOR,UA: YELLOW
SL AMB POCT KETONES,UA: NORMAL
SL AMB POCT NITRITE,UA: NORMAL
SL AMB POCT PH,UA: 6
SL AMB POCT SPECIFIC GRAVITY,UA: 1.01
SL AMB POCT URINE PROTEIN: NORMAL
SL AMB POCT UROBILINOGEN: 0.2
SP GR UR STRIP.AUTO: 1.02 (ref 1–1.03)
UROBILINOGEN UR STRIP-ACNC: <2 MG/DL
WBC #/AREA URNS AUTO: ABNORMAL /HPF

## 2025-04-30 PROCEDURE — 87389 HIV-1 AG W/HIV-1&-2 AB AG IA: CPT

## 2025-04-30 PROCEDURE — 81002 URINALYSIS NONAUTO W/O SCOPE: CPT | Performed by: FAMILY MEDICINE

## 2025-04-30 PROCEDURE — 87086 URINE CULTURE/COLONY COUNT: CPT | Performed by: FAMILY MEDICINE

## 2025-04-30 PROCEDURE — 99213 OFFICE O/P EST LOW 20 MIN: CPT | Performed by: FAMILY MEDICINE

## 2025-04-30 PROCEDURE — 36415 COLL VENOUS BLD VENIPUNCTURE: CPT

## 2025-04-30 PROCEDURE — 81001 URINALYSIS AUTO W/SCOPE: CPT | Performed by: FAMILY MEDICINE

## 2025-04-30 PROCEDURE — 87491 CHLMYD TRACH DNA AMP PROBE: CPT

## 2025-04-30 PROCEDURE — 87591 N.GONORRHOEAE DNA AMP PROB: CPT

## 2025-04-30 PROCEDURE — 86780 TREPONEMA PALLIDUM: CPT

## 2025-04-30 RX ORDER — NITROFURANTOIN 25; 75 MG/1; MG/1
100 CAPSULE ORAL 2 TIMES DAILY
Qty: 10 CAPSULE | Refills: 0 | Status: SHIPPED | OUTPATIENT
Start: 2025-04-30 | End: 2025-05-05

## 2025-04-30 NOTE — PROGRESS NOTES
"Name: Elisa Mcneill      : 2005      MRN: [id number]  Encounter Provider: Kings Laboy MD  Encounter Date: 2025   Encounter department: [facility]  :  Assessment & Plan  UTI symptoms  New onset dysuria.  Urine dip negative in office we will send for culture.  This could be sensitivity to recent condom use.  If symptoms do not improve in the next 24 hours can start Macrobid and will send for culture  Orders:    POCT urine dip    nitrofurantoin (MACROBID) 100 mg capsule; Take 1 capsule (100 mg total) by mouth 2 (two) times a day for 5 days    Screening for STDs (sexually transmitted diseases)  Patient requested screening  Orders:    Chlamydia/GC amplified DNA by PCR; Future    RPR-Syphilis Screening (Total Syphilis IGG/IGM); Future    HIV 1/2 AB/AG w Reflex [facility] for 2 yr old and above; Future           History of Present Illness   Patient presents with:  Urinary Tract Infection: burns when she urinates since yesterday    Patient presents today with UTI symptoms.  Burning when she urinates.  Symptoms began yesterday.  She does note a new partner and recently used condoms during intercourse.  Unsure if this is a reaction to the condoms.  Has never had a reaction in the past.  Unsure which type of cotton or if there was any lubricant/spermicide on the condom.  Denies any flank pain or pelvic pressure.  Symptoms are slightly improving from yesterday.    Urinary Tract Infection   Associated symptoms include urgency.     Review of Systems   Genitourinary:  Positive for dysuria and urgency.       Objective   /74 (BP Location: Left arm, Patient Position: Sitting, Cuff Size: Standard)   Pulse 68   Temp 98 °F (36.7 °C) (Temporal)   Resp 18   Ht 5' 2\" (1.575 m)   Wt 76.9 kg (169 lb 8 oz)   SpO2 100%   BMI 31.00 kg/m²      Physical Exam  Vitals and nursing note reviewed.   Constitutional:       Appearance: Normal appearance. She is well-developed.   HENT:      Head: " Normocephalic and atraumatic.   Cardiovascular:      Rate and Rhythm: Normal rate and regular rhythm.   Pulmonary:      Effort: Pulmonary effort is normal.      Breath sounds: Normal breath sounds.   Abdominal:      General: Bowel sounds are normal.      Palpations: Abdomen is soft.   Musculoskeletal:      Cervical back: Normal range of motion.   Skin:     General: Skin is warm.   Neurological:      General: No focal deficit present.      Mental Status: She is alert.   Psychiatric:         Mood and Affect: Mood normal.         Speech: Speech normal.

## 2025-05-01 ENCOUNTER — RESULTS FOLLOW-UP (OUTPATIENT)
Dept: FAMILY MEDICINE CLINIC | Facility: CLINIC | Age: 20
End: 2025-05-01

## 2025-05-01 LAB
BACTERIA UR CULT: NORMAL
C TRACH DNA SPEC QL NAA+PROBE: NEGATIVE
HIV 1+2 AB+HIV1 P24 AG SERPL QL IA: NORMAL
N GONORRHOEA DNA SPEC QL NAA+PROBE: NEGATIVE
TREPONEMA PALLIDUM IGG+IGM AB [PRESENCE] IN SERUM OR PLASMA BY IMMUNOASSAY: NORMAL

## 2025-05-19 ENCOUNTER — OFFICE VISIT (OUTPATIENT)
Dept: OBGYN CLINIC | Facility: CLINIC | Age: 20
End: 2025-05-19
Payer: COMMERCIAL

## 2025-05-19 VITALS
DIASTOLIC BLOOD PRESSURE: 74 MMHG | WEIGHT: 169 LBS | SYSTOLIC BLOOD PRESSURE: 118 MMHG | BODY MASS INDEX: 31.1 KG/M2 | HEIGHT: 62 IN

## 2025-05-19 DIAGNOSIS — Z30.09 BIRTH CONTROL COUNSELING: Primary | ICD-10-CM

## 2025-05-19 PROCEDURE — 99213 OFFICE O/P EST LOW 20 MIN: CPT | Performed by: PHYSICIAN ASSISTANT

## 2025-05-19 NOTE — PROGRESS NOTES
ASSESSMENT/PLAN:    Encounter Diagnosis     ICD-10-CM    1. Birth control counseling  Z30.09         - Reviewed with patient that she is able to stop her birth control at any time. However, it is likely that her cycles will return to being painful and heavy. I discussed with patient that there is no evidence to suggest that long term use of hormonal contraception is detrimental to future fertility. In the case of presence of menstrual disorders such as endometriosis, combined contraception is recommended as the primary treatment method for these conditions and serves as protection for the uterus and ovaries. Discussed with patient that if she stops her patch and is uncomfortable with her cycles, she can restart at any time  - Encouraged patient to use condoms for pregnancy/STI prevention, particularly in the setting of stopping her birth control  - All questions answered  - RTO 6 months for annual exam.       SUBJECTIVE/HPI:      Patient ID: Elisa Mcneill 2005       Elisa Mcneill is a 20 y.o.  presenting to the office as a new patient to discuss birth control. She has been on the Xulane patch for about 6 years. She started this method due to very painful and heavy periods. She likes the patch and denies any side effects. She states it has controlled her cycles well.   She is here today to discuss stopping birth control. Patient is concerned that it is not good for her body and that being on it long term will cause her to be infertile. She would like to have children in the future but does not plan on that for a few years. She does not wish to be pregnant at this time.       HISTORY:    Patient Active Problem List   Diagnosis    Menorrhagia with regular cycle    Generalized anxiety disorder    Sleep disturbance    Panic attacks    Low ferritin level    Acute cholecystitis due to biliary calculus    Mild intermittent asthma       Allergies   Allergen Reactions    Buspar [Buspirone] Other (See  Comments)     Nightmares       Penicillins Rash         Current Outpatient Medications:     albuterol (PROVENTIL HFA,VENTOLIN HFA) 90 mcg/act inhaler, Inhale 2 puffs every 6 (six) hours as needed for wheezing, Disp: 8 g, Rfl: 0    ALPRAZolam (XANAX) 0.5 mg tablet, Take 1 tablet (0.5 mg total) by mouth daily at bedtime as needed for anxiety for up to 10 days, Disp: 10 tablet, Rfl: 0    escitalopram (LEXAPRO) 20 mg tablet, Take 1 tablet (20 mg total) by mouth daily, Disp: 90 tablet, Rfl: 3    norelgestromin-ethinyl estradiol (Xulane) 150-35 MCG/24HR, APPLY 1 PATCH ONE TIME PER WEEK, Disp: 3 patch, Rfl: 1    traZODone (DESYREL) 100 mg tablet, Take 1 tablet (100 mg total) by mouth daily at bedtime as needed for sleep, Disp: 90 tablet, Rfl: 1    OB History          0    Para   0    Term   0       0    AB   0    Living   0         SAB   0    IAB   0    Ectopic   0    Multiple   0    Live Births   0                 Past Medical History:   Diagnosis Date    Anemia     Anxiety 3/10/22    Asthma     Cancer (HCC)     Coronary artery disease     Depression     Diabetes mellitus (HCC)     Disease of thyroid gland     GERD (gastroesophageal reflux disease)     Headache(784.0)     Hypertension     Kidney stone     Myocardial infarction (HCC)     Sleep apnea     Urinary tract infection         Past Surgical History:   Procedure Laterality Date    CHOLECYSTECTOMY  23    CHOLECYSTECTOMY LAPAROSCOPIC N/A 2023    Procedure: CHOLECYSTECTOMY LAPAROSCOPIC: POSSIBLE OPEN;  Surgeon: Kristian Mcmahon MD;  Location: [facility];  Service: General    CORONARY ARTERY BYPASS GRAFT      DENTAL SURGERY          Family History   Problem Relation Age of Onset    Heart disease Father     Hypertension Father     Diabetes Father     Coronary artery disease Father     Hyperlipidemia Father     Deep vein thrombosis Father     Heart attack Father     Thyroid disease Father     Ovarian cancer Maternal Grandmother     Diabetes  "Maternal Grandmother     Cancer Maternal Grandmother     Arthritis Mother     Anxiety disorder Mother     Anemia Mother     No Known Problems Sister     Dementia Maternal Grandfather     Lupus Maternal Aunt     Clotting disorder Sister         REVIEW OF SYSTEMS:  Review of Systems   Respiratory:  Negative for shortness of breath.    Cardiovascular:  Negative for chest pain.   Genitourinary:  Negative for menstrual problem and vaginal bleeding.        OBJECTIVE:    Visit Vitals  /74 (BP Location: Left arm, Patient Position: Sitting, Cuff Size: Standard)   Ht 5' 2\" (1.575 m)   Wt 76.7 kg (169 lb)   LMP 04/19/2025   BMI 30.91 kg/m²   OB Status Having periods   Smoking Status Never   BSA 1.78 m²         Physical Exam  Constitutional:       General: She is not in acute distress.     Appearance: Normal appearance. She is not ill-appearing, toxic-appearing or diaphoretic.   HENT:      Head: Normocephalic and atraumatic.   Pulmonary:      Effort: Pulmonary effort is normal.     Neurological:      General: No focal deficit present.      Mental Status: She is alert.     Skin:     General: Skin is dry.     Psychiatric:         Mood and Affect: Mood normal.         Behavior: Behavior normal.   Vitals reviewed.           "

## 2025-05-22 DIAGNOSIS — R00.0 TACHYCARDIA: Primary | ICD-10-CM

## 2025-05-22 DIAGNOSIS — R00.2 PALPITATIONS: ICD-10-CM

## 2025-06-19 DIAGNOSIS — N92.0 MENORRHAGIA WITH REGULAR CYCLE: ICD-10-CM

## 2025-06-19 DIAGNOSIS — N94.6 DYSMENORRHEA IN ADOLESCENT: ICD-10-CM

## 2025-06-19 RX ORDER — NORELGESTROMIN AND ETHINYL ESTRADIOL 35; 150 UG/MG; UG/MG
1 PATCH TRANSDERMAL WEEKLY
Qty: 9 PATCH | Refills: 1 | Status: SHIPPED | OUTPATIENT
Start: 2025-06-19

## 2025-07-09 ENCOUNTER — HOSPITAL ENCOUNTER (OUTPATIENT)
Dept: NON INVASIVE DIAGNOSTICS | Facility: HOSPITAL | Age: 20
Discharge: HOME | End: 2025-07-09
Payer: COMMERCIAL

## 2025-07-09 DIAGNOSIS — R00.0 TACHYCARDIA: ICD-10-CM

## 2025-07-09 DIAGNOSIS — R00.2 PALPITATIONS: ICD-10-CM

## 2025-07-09 LAB
MAX DIASTOLIC BP: 90 MMHG
MAX PREDICTED HEART RATE: 198 BPM
PROTOCOL NAME: NORMAL
REASON FOR TERMINATION: NORMAL
STRESS POST EXERCISE DUR MIN: 45 MIN
STRESS POST EXERCISE DUR SEC: 0 SEC
STRESS POST PEAK HR: 116 BPM
STRESS POST PEAK SYSTOLIC BP: 140 MMHG
TARGET HR FORMULA: NORMAL
TEST INDICATION: NORMAL

## 2025-07-09 PROCEDURE — 93660 TILT TABLE EVALUATION: CPT

## 2025-07-09 PROCEDURE — 93660 TILT TABLE EVALUATION: CPT | Performed by: INTERNAL MEDICINE

## 2025-07-14 ENCOUNTER — OFFICE VISIT (OUTPATIENT)
Dept: URGENT CARE | Facility: MEDICAL CENTER | Age: 20
End: 2025-07-14
Payer: COMMERCIAL

## 2025-07-14 VITALS
BODY MASS INDEX: 31.1 KG/M2 | OXYGEN SATURATION: 100 % | SYSTOLIC BLOOD PRESSURE: 131 MMHG | HEIGHT: 62 IN | DIASTOLIC BLOOD PRESSURE: 77 MMHG | RESPIRATION RATE: 18 BRPM | WEIGHT: 169 LBS | TEMPERATURE: 97.8 F | HEART RATE: 77 BPM

## 2025-07-14 DIAGNOSIS — H60.502 ACUTE OTITIS EXTERNA OF LEFT EAR, UNSPECIFIED TYPE: Primary | ICD-10-CM

## 2025-07-14 PROCEDURE — G0382 LEV 3 HOSP TYPE B ED VISIT: HCPCS | Performed by: PHYSICIAN ASSISTANT

## 2025-07-14 RX ORDER — OFLOXACIN 3 MG/ML
10 SOLUTION AURICULAR (OTIC) DAILY
Qty: 3.5 ML | Refills: 0 | Status: SHIPPED | OUTPATIENT
Start: 2025-07-14 | End: 2025-07-21

## 2025-07-14 NOTE — PROGRESS NOTES
[facility]  Name: Elisa Mcneill      : 2005      MRN: [id number]  Encounter Provider: Magalie Montilla PA-C  Encounter Date: 2025   Encounter department: [facility] [place]  :  Assessment & Plan  Acute otitis externa of left ear, unspecified type    Orders:    ofloxacin (FLOXIN) 0.3 % otic solution; Administer 10 drops into the left ear daily for 7 days  Patient presents with symptoms and examination consistent with otitis externa will start on ofloxacin drops to treat.      Patient Instructions  Follow up with PCP in 3-5 days.  Proceed to  ER if symptoms worsen.    If tests are performed, our office will contact you with results only if changes need to made to the care plan discussed with you at the visit. You can review your full results on [facility] MyChart.    Chief Complaint:   Chief Complaint   Patient presents with    Earache     Left ear pain; sharp; states she believes she may have put a qtip too far in her ear; loss of hearing in that ear      History of Present Illness   20-year-old female presents with complaint of left ear pain and hearing loss.  Patient reports that she was using Q-tips on Saturday when symptoms started.  She is concerned she may have damaged her eardrum.    Earache   Pertinent negatives include no coughing, ear discharge or rhinorrhea.         Review of Systems   Constitutional:  Negative for fever.   HENT:  Positive for ear pain. Negative for congestion, ear discharge, postnasal drip and rhinorrhea.    Respiratory:  Negative for cough.    Neurological:  Negative for dizziness and light-headedness.     Past Medical History   Past Medical History[1]  Past Surgical History[2]  Family History[3]  she reports that she has never smoked. She has never been exposed to tobacco smoke. She has never used smokeless tobacco. She reports that she does not drink alcohol and does not use drugs.  Current Outpatient Medications   Medication Instructions    albuterol  "(PROVENTIL HFA,VENTOLIN HFA) 90 mcg/act inhaler 2 puffs, Inhalation, Every 6 hours PRN    ALPRAZolam (XANAX) 0.5 mg, Oral, Daily at bedtime PRN    escitalopram (LEXAPRO) 20 mg, Oral, Daily    norelgestromin-ethinyl estradiol (Xulane) 150-35 MCG/24HR 1 patch, Transdermal, Weekly    ofloxacin (FLOXIN) 0.3 % otic solution 10 drops, Left Ear, Daily    traZODone (DESYREL) 100 mg, Oral, Daily at bedtime PRN   Allergies[4]     Objective   /77   Pulse 77   Temp 97.8 °F (36.6 °C) (Temporal)   Resp 18   Ht 5' 2\" (1.575 m)   Wt 76.7 kg (169 lb)   SpO2 100%   BMI 30.91 kg/m²      Physical Exam  Vitals and nursing note reviewed.   Constitutional:       General: She is awake. She is not in acute distress.  HENT:      Head: Normocephalic and atraumatic.      Right Ear: Hearing, tympanic membrane, ear canal and external ear normal.      Left Ear: Hearing and external ear normal.      Ears:      Comments: Ear canal appears dry and irritated.  Tympanic membrane is intact on the left side.     Nose: No nasal deformity.      Mouth/Throat:      Lips: Pink. No lesions.     Skin:     Coloration: Skin is not pale.     Neurological:      Mental Status: She is alert and easily aroused.     Psychiatric:         Attention and Perception: Attention and perception normal.         Mood and Affect: Mood and affect normal.         Behavior: Behavior normal. Behavior is cooperative.         Portions of the record may have been created with voice recognition software.  Occasional wrong word or \"sound a like\" substitutions may have occurred due to the inherent limitations of voice recognition software.  Read the chart carefully and recognize, using context, where substitutions have occurred.       [1]   Past Medical History:  Diagnosis Date    Anemia     Anxiety 3/10/22    Asthma     Cancer (HCC)     Coronary artery disease     Depression     Diabetes mellitus (HCC)     Disease of thyroid gland     GERD (gastroesophageal reflux disease)  "    Headache(784.0)     Hypertension     Kidney stone     Myocardial infarction (HCC)     Sleep apnea     Urinary tract infection    [2]   Past Surgical History:  Procedure Laterality Date    CHOLECYSTECTOMY  06-21-23    CHOLECYSTECTOMY LAPAROSCOPIC N/A 06/21/2023    Procedure: CHOLECYSTECTOMY LAPAROSCOPIC: POSSIBLE OPEN;  Surgeon: Kristian Mcmahon MD;  [facility];  Service: General    CORONARY ARTERY BYPASS GRAFT      DENTAL SURGERY     [3]   Family History  Problem Relation Name Age of Onset    Heart disease Father Father     Hypertension Father Father     Diabetes Father Father     Coronary artery disease Father Father     Hyperlipidemia Father Father     Deep vein thrombosis Father Father     Heart attack Father Father     Thyroid disease Father Father     Ovarian cancer Maternal Grandmother Grandmother     Diabetes Maternal Grandmother Grandmother     Cancer Maternal Grandmother Grandmother     Arthritis Mother Mother     Anxiety disorder Mother Mother     Anemia Mother Mother     No Known Problems Sister      Dementia Maternal Grandfather Gradfather     Lupus Maternal Aunt Aunt     Clotting disorder Sister Vidhi    [4]   Allergies  Allergen Reactions    Buspar [Buspirone] Other (See Comments)     Nightmares       Penicillins Rash

## 2025-07-14 NOTE — PATIENT INSTRUCTIONS
Ofloxacin drops as prescribed.  Leave affected ear out for 3 to 5 minutes after application of drops.  If symptoms or not improved in 2 to 3 days follow-up with PCP.  If symptoms worsen or new symptoms develop report to the emergency room immediately.

## 2025-07-22 DIAGNOSIS — F17.200 TOBACCO DEPENDENCY: Primary | ICD-10-CM

## 2025-07-22 RX ORDER — BUPROPION HYDROCHLORIDE 150 MG/1
150 TABLET, EXTENDED RELEASE ORAL 2 TIMES DAILY
Qty: 180 TABLET | Refills: 1 | Status: SHIPPED | OUTPATIENT
Start: 2025-07-22

## 2025-07-27 ENCOUNTER — HOSPITAL ENCOUNTER (EMERGENCY)
Facility: HOSPITAL | Age: 20
Discharge: HOME | End: 2025-07-27
Attending: INTERNAL MEDICINE | Admitting: INTERNAL MEDICINE
Payer: COMMERCIAL

## 2025-07-27 ENCOUNTER — OFFICE VISIT (OUTPATIENT)
Dept: URGENT CARE | Facility: MEDICAL CENTER | Age: 20
End: 2025-07-27
Payer: COMMERCIAL

## 2025-07-27 VITALS
RESPIRATION RATE: 18 BRPM | OXYGEN SATURATION: 98 % | HEIGHT: 62 IN | SYSTOLIC BLOOD PRESSURE: 148 MMHG | BODY MASS INDEX: 31.1 KG/M2 | WEIGHT: 169 LBS | TEMPERATURE: 98 F | HEART RATE: 80 BPM | DIASTOLIC BLOOD PRESSURE: 85 MMHG

## 2025-07-27 VITALS
RESPIRATION RATE: 18 BRPM | TEMPERATURE: 98.7 F | SYSTOLIC BLOOD PRESSURE: 172 MMHG | OXYGEN SATURATION: 97 % | HEART RATE: 113 BPM | DIASTOLIC BLOOD PRESSURE: 97 MMHG

## 2025-07-27 DIAGNOSIS — R20.2 NUMBNESS AND TINGLING: Primary | ICD-10-CM

## 2025-07-27 DIAGNOSIS — R20.0 NUMBNESS AND TINGLING: Primary | ICD-10-CM

## 2025-07-27 DIAGNOSIS — R20.0 RIGHT ARM NUMBNESS: Primary | ICD-10-CM

## 2025-07-27 LAB
ALBUMIN SERPL BCG-MCNC: 4.7 G/DL (ref 3.5–5)
ALP SERPL-CCNC: 101 U/L (ref 34–104)
ALT SERPL W P-5'-P-CCNC: 16 U/L (ref 7–52)
ANION GAP SERPL CALCULATED.3IONS-SCNC: 13 MMOL/L (ref 4–13)
AST SERPL W P-5'-P-CCNC: 18 U/L (ref 13–39)
BASOPHILS # BLD AUTO: 0.03 THOUSANDS/ÂΜL (ref 0–0.1)
BASOPHILS NFR BLD AUTO: 0 % (ref 0–1)
BILIRUB SERPL-MCNC: 0.45 MG/DL (ref 0.2–1)
BUN SERPL-MCNC: 9 MG/DL (ref 5–25)
CALCIUM SERPL-MCNC: 10 MG/DL (ref 8.4–10.2)
CHLORIDE SERPL-SCNC: 103 MMOL/L (ref 96–108)
CO2 SERPL-SCNC: 23 MMOL/L (ref 21–32)
CREAT SERPL-MCNC: 0.8 MG/DL (ref 0.6–1.3)
EOSINOPHIL # BLD AUTO: 0.11 THOUSAND/ÂΜL (ref 0–0.61)
EOSINOPHIL NFR BLD AUTO: 1 % (ref 0–6)
ERYTHROCYTE [DISTWIDTH] IN BLOOD BY AUTOMATED COUNT: 13.2 % (ref 11.6–15.1)
GFR SERPL CREATININE-BSD FRML MDRD: 104 ML/MIN/1.73SQ M
GLUCOSE SERPL-MCNC: 87 MG/DL (ref 65–140)
HCT VFR BLD AUTO: 42.6 % (ref 34.8–46.1)
HGB BLD-MCNC: 13.7 G/DL (ref 11.5–15.4)
IMM GRANULOCYTES # BLD AUTO: 0.03 THOUSAND/UL (ref 0–0.2)
IMM GRANULOCYTES NFR BLD AUTO: 0 % (ref 0–2)
LYMPHOCYTES # BLD AUTO: 2.84 THOUSANDS/ÂΜL (ref 0.6–4.47)
LYMPHOCYTES NFR BLD AUTO: 24 % (ref 14–44)
MCH RBC QN AUTO: 27.7 PG (ref 26.8–34.3)
MCHC RBC AUTO-ENTMCNC: 32.2 G/DL (ref 31.4–37.4)
MCV RBC AUTO: 86 FL (ref 82–98)
MONOCYTES # BLD AUTO: 0.82 THOUSAND/ÂΜL (ref 0.17–1.22)
MONOCYTES NFR BLD AUTO: 7 % (ref 4–12)
NEUTROPHILS # BLD AUTO: 8.08 THOUSANDS/ÂΜL (ref 1.85–7.62)
NEUTS SEG NFR BLD AUTO: 68 % (ref 43–75)
NRBC BLD AUTO-RTO: 0 /100 WBCS
PLATELET # BLD AUTO: 411 THOUSANDS/UL (ref 149–390)
PMV BLD AUTO: 9.5 FL (ref 8.9–12.7)
POTASSIUM SERPL-SCNC: 3.7 MMOL/L (ref 3.5–5.3)
PROT SERPL-MCNC: 7.8 G/DL (ref 6.4–8.4)
RBC # BLD AUTO: 4.94 MILLION/UL (ref 3.81–5.12)
SODIUM SERPL-SCNC: 139 MMOL/L (ref 135–147)
WBC # BLD AUTO: 11.91 THOUSAND/UL (ref 4.31–10.16)

## 2025-07-27 PROCEDURE — 85025 COMPLETE CBC W/AUTO DIFF WBC: CPT | Performed by: PHYSICIAN ASSISTANT

## 2025-07-27 PROCEDURE — 36415 COLL VENOUS BLD VENIPUNCTURE: CPT | Performed by: PHYSICIAN ASSISTANT

## 2025-07-27 PROCEDURE — 80053 COMPREHEN METABOLIC PANEL: CPT | Performed by: PHYSICIAN ASSISTANT

## 2025-07-27 PROCEDURE — G0382 LEV 3 HOSP TYPE B ED VISIT: HCPCS | Performed by: PHYSICIAN ASSISTANT

## 2025-07-27 PROCEDURE — 99284 EMERGENCY DEPT VISIT MOD MDM: CPT | Performed by: INTERNAL MEDICINE

## 2025-07-27 PROCEDURE — 99284 EMERGENCY DEPT VISIT MOD MDM: CPT

## 2025-07-28 PROBLEM — R00.2 PALPITATIONS: Status: ACTIVE | Noted: 2025-07-28

## 2025-07-29 ENCOUNTER — OFFICE VISIT (OUTPATIENT)
Dept: CARDIOLOGY CLINIC | Facility: CLINIC | Age: 20
End: 2025-07-29
Payer: COMMERCIAL

## 2025-07-29 VITALS
WEIGHT: 163.4 LBS | DIASTOLIC BLOOD PRESSURE: 68 MMHG | OXYGEN SATURATION: 99 % | BODY MASS INDEX: 29.89 KG/M2 | HEART RATE: 90 BPM | SYSTOLIC BLOOD PRESSURE: 106 MMHG

## 2025-07-29 DIAGNOSIS — R00.2 PALPITATIONS: Primary | ICD-10-CM

## 2025-07-29 PROCEDURE — 99214 OFFICE O/P EST MOD 30 MIN: CPT | Performed by: NURSE PRACTITIONER

## 2025-08-11 ENCOUNTER — HOSPITAL ENCOUNTER (EMERGENCY)
Facility: HOSPITAL | Age: 20
Discharge: HOME | End: 2025-08-12
Attending: EMERGENCY MEDICINE
Payer: COMMERCIAL

## (undated) DEVICE — SUT MONOCRYL 4-0 PS-2 27 IN Y426H

## (undated) DEVICE — DECANTER: Brand: UNBRANDED

## (undated) DEVICE — INTENDED FOR TISSUE SEPARATION, AND OTHER PROCEDURES THAT REQUIRE A SHARP SURGICAL BLADE TO PUNCTURE OR CUT.: Brand: BARD-PARKER SAFETY BLADES SIZE 11, STERILE

## (undated) DEVICE — ADHESIVE SKIN HIGH VISCOSITY EXOFIN 1ML

## (undated) DEVICE — LAPAROSCOPIC SMOKE EVAC TUBING

## (undated) DEVICE — NEEDLE 22 G X 1 1/2 SAFETY

## (undated) DEVICE — TOWEL SURG XR DETECT GREEN STRL RFD

## (undated) DEVICE — TROCAR: Brand: KII FIOS FIRST ENTRY

## (undated) DEVICE — UNDYED BRAIDED (POLYGLACTIN 910), SYNTHETIC ABSORBABLE SUTURE: Brand: COATED VICRYL

## (undated) DEVICE — GLOVE SRG BIOGEL ECLIPSE 7

## (undated) DEVICE — TROCAR APPPLE 5MM EXTENDED LENGTH

## (undated) DEVICE — STERILE SURGICAL LUBRICANT,  TUBE: Brand: SURGILUBE

## (undated) DEVICE — TISSUE RETRIEVAL SYSTEM: Brand: INZII RETRIEVAL SYSTEM

## (undated) DEVICE — PAD GROUNDING ADULT

## (undated) DEVICE — PACK PBDS LAP CHOLE RF

## (undated) DEVICE — ELECTRODE LAP J HOOK SPLIT STEM E-Z CLEAN 33CM -0021S

## (undated) DEVICE — DRAPE EQUIPMENT RF WAND

## (undated) DEVICE — LIGAMAX 5 MM ENDOSCOPIC MULTIPLE CLIP APPLIER: Brand: LIGAMAX